# Patient Record
Sex: FEMALE | Race: WHITE | NOT HISPANIC OR LATINO | Employment: FULL TIME | URBAN - METROPOLITAN AREA
[De-identification: names, ages, dates, MRNs, and addresses within clinical notes are randomized per-mention and may not be internally consistent; named-entity substitution may affect disease eponyms.]

---

## 2017-04-12 ENCOUNTER — TELEPHONE (OUTPATIENT)
Dept: FAMILY MEDICINE | Facility: CLINIC | Age: 22
End: 2017-04-12

## 2017-04-19 ENCOUNTER — TELEPHONE (OUTPATIENT)
Dept: FAMILY MEDICINE | Facility: CLINIC | Age: 22
End: 2017-04-19

## 2017-04-19 ENCOUNTER — OFFICE VISIT (OUTPATIENT)
Dept: FAMILY MEDICINE | Facility: CLINIC | Age: 22
End: 2017-04-19
Payer: COMMERCIAL

## 2017-04-19 VITALS
DIASTOLIC BLOOD PRESSURE: 62 MMHG | HEART RATE: 60 BPM | OXYGEN SATURATION: 97 % | BODY MASS INDEX: 23.22 KG/M2 | TEMPERATURE: 97.2 F | WEIGHT: 136 LBS | HEIGHT: 64 IN | SYSTOLIC BLOOD PRESSURE: 106 MMHG | RESPIRATION RATE: 14 BRPM

## 2017-04-19 DIAGNOSIS — N92.6 IRREGULAR MENSES: Primary | ICD-10-CM

## 2017-04-19 DIAGNOSIS — Z20.2 EXPOSURE TO STD: ICD-10-CM

## 2017-04-19 DIAGNOSIS — Z12.4 SCREENING FOR MALIGNANT NEOPLASM OF CERVIX: ICD-10-CM

## 2017-04-19 LAB
B-HCG SERPL-ACNC: <1 IU/L (ref 0–5)
FSH SERPL-ACNC: 9.1 IU/L
LH SERPL-ACNC: 11.8 IU/L
MICRO REPORT STATUS: ABNORMAL
PROLACTIN SERPL-MCNC: 11 UG/L (ref 3–27)
SPECIMEN SOURCE: ABNORMAL
TSH SERPL DL<=0.005 MIU/L-ACNC: 1.84 MU/L (ref 0.4–4)
WET PREP SPEC: ABNORMAL

## 2017-04-19 PROCEDURE — 87591 N.GONORRHOEAE DNA AMP PROB: CPT | Performed by: OBSTETRICS & GYNECOLOGY

## 2017-04-19 PROCEDURE — G0145 SCR C/V CYTO,THINLAYER,RESCR: HCPCS | Performed by: OBSTETRICS & GYNECOLOGY

## 2017-04-19 PROCEDURE — 83002 ASSAY OF GONADOTROPIN (LH): CPT | Performed by: OBSTETRICS & GYNECOLOGY

## 2017-04-19 PROCEDURE — 87491 CHLMYD TRACH DNA AMP PROBE: CPT | Performed by: OBSTETRICS & GYNECOLOGY

## 2017-04-19 PROCEDURE — 84146 ASSAY OF PROLACTIN: CPT | Performed by: OBSTETRICS & GYNECOLOGY

## 2017-04-19 PROCEDURE — 87624 HPV HI-RISK TYP POOLED RSLT: CPT | Performed by: OBSTETRICS & GYNECOLOGY

## 2017-04-19 PROCEDURE — 87210 SMEAR WET MOUNT SALINE/INK: CPT | Performed by: OBSTETRICS & GYNECOLOGY

## 2017-04-19 PROCEDURE — 84443 ASSAY THYROID STIM HORMONE: CPT | Performed by: OBSTETRICS & GYNECOLOGY

## 2017-04-19 PROCEDURE — 99214 OFFICE O/P EST MOD 30 MIN: CPT | Performed by: OBSTETRICS & GYNECOLOGY

## 2017-04-19 PROCEDURE — 84702 CHORIONIC GONADOTROPIN TEST: CPT | Performed by: OBSTETRICS & GYNECOLOGY

## 2017-04-19 PROCEDURE — 36415 COLL VENOUS BLD VENIPUNCTURE: CPT | Performed by: OBSTETRICS & GYNECOLOGY

## 2017-04-19 PROCEDURE — 83001 ASSAY OF GONADOTROPIN (FSH): CPT | Performed by: OBSTETRICS & GYNECOLOGY

## 2017-04-19 ASSESSMENT — PAIN SCALES - GENERAL: PAINLEVEL: NO PAIN (0)

## 2017-04-19 NOTE — PROGRESS NOTES
Josselyn Please inform Laine/ or caretaker  that this result(s) is/are normal except that her wet prep shows bacterial vaginosis- it isnt an STD but more of an inflammation of her own bacterial growth- she should take metronidazole 500 mg BID for 7 days- please ask her where and call it in- avoid alcohol while taking because it will make her nauseated. We will call when we get the other tests back.Thanks. ELHAM Peterson MD

## 2017-04-19 NOTE — LETTER
April 28, 2017    Laine Melgar  [address] [address]    Dear Laine,  We are happy to inform you that your PAP smear result from 4/19/17 is normal.  We are now able to do a follow up test on PAP smears. The DNA test is for HPV (Human Papilloma Virus). Cervical cancer is closely linked with certain types of HPV. Your result showed no evidence of high risk HPV.  Therefore we recommend you return in 3 years for your next pap smear.  You will still need to return to the clinic every year for an annual exam and other preventive tests.  Please contact the clinic at [phone] with any questions.  Sincerely,    Rosalio Peterson MD/nicky

## 2017-04-19 NOTE — PROGRESS NOTES
Subjective: she is requesting a pap and STD testing- just a gen probe. Not blood tests. She would like HPV testing because she is very worried that she could have it. She has had unprotected intercourse. Last period 9 days ago. She sometimes gets irregular periods but doesn't want birth control pills because she is afraid it will hamper her future fertility.   I questioned her twice and she doesn't want blood testing for STDs but is insisting on gen probe and HPV testing.             The past medical history, social history, past surgical history and family history as shown below have been reviewed by me today.  Past Medical History:   Diagnosis Date     Strep throat     hx scarlet fever        Allergies   Allergen Reactions     Sulfa Drugs Hives     Current Outpatient Prescriptions   Medication Sig Dispense Refill     acetaminophen-caff-pyrilamine (MIDOL MENSTRUAL) 500-60-15 MG TABS Take 2 tablets by mouth every 6 hours as needed Reported on 2017       TYLENOL 325 MG OR TABS Reported on 2017       History reviewed. No pertinent surgical history.  Social History     Social History     Marital status: Single     Spouse name: N/A     Number of children: N/A     Years of education: N/A     Social History Main Topics     Smoking status: Never Smoker     Smokeless tobacco: Never Used      Comment: No smokers in home anymore as on .     Alcohol use No     Drug use: No     Sexual activity: Yes     Partners: Male     Other Topics Concern     None     Social History Narrative     Family History   Problem Relation Age of Onset     C.A.D. Maternal Grandfather      C.A.D. Paternal Grandfather      DIABETES Mother      Circulatory Mother      CEREBROVASCULAR DISEASE Father      Hypertension Father      C.A.D. Father      Genetic Disorder Father      kidney transplant     C.A.D. Paternal Grandmother      Congenital Anomalies Other 23     Great-great Grandmother  at 23, possible Marfan's Syndrome       ROS: A 12  "point review of systems was done. Except for what is listed above in the HPI, the systems review is negative .      Objective: Vital signs: Blood pressure 106/62, pulse 60, temperature 97.2  F (36.2  C), temperature source Tympanic, resp. rate 14, height 5' 4\" (1.626 m), weight 136 lb (61.7 kg), last menstrual period 04/10/2017, SpO2 97 %, not currently breastfeeding.    HEENT:    Sclerae and conjunctiva are normal.   Ear canals and TMs look normal.  Nasal mucosa is pink  - no polyps or masses seen.  sinuses are non tender to palpation.  Throat is unremarkable . Mucous membranes are moist.   Neck is supple, mobile, no adenoapthy or masses palpable. Normal range of motion noted.  Chest is clear to auscultation. No wheezes, rales or rhonchi heard.  cardiac exam is normal with s1, s2, no murmurs or adventitious sounds.Normal rate and rhythm is heard.  Abdomen is soft,  nondistended, No masses felt.No HSM. No guarding or rigidity or rebound noted. Palpation reveals  no    tenderness   Normal bowel sounds heard.   Pelvic exam:My nurse Josselyn   was present to chaperone the exam.    The external genitalia appeared normal.      The vaginal vault was without bleeding or odor. There is a whitish discharge noted.     The cervix was smooth and shiny and normal in appearance.      A pap was obtained.   A gen probe   was obtained.   A wet prep was obtained.     No vaginal support defects were noted,      Bimanual exam revealed a  Nulliparous  sized uterus. It does not   descend   well in the vaginal vault.      No adnexal masses were felt.      There was no  cervical motion tenderness.      Exam was NOT   limited by the patient's body habitus.              Assessment/Plan:    1. 21 year old female with irregular menses and exposure to STDs- she wants testing- see lab orders for hormone tests. Also we will send off gen probe, wet prep and pap/HPV.     2. We will plan to call her with reslts.    3. I discussed contraception options " but she wants to stay with condoms.      ELHAM Peterson MD

## 2017-04-19 NOTE — MR AVS SNAPSHOT
"              After Visit Summary   2017    Laine Melgar    MRN: [id number]           Patient Information     Date Of Birth          1995        Visit Information        Provider Department      2017 12:50 PM Rosalio Peterson MD [facility]        Today's Diagnoses     Irregular menses    -  1    Exposure to STD        Screening for malignant neoplasm of cervix           Follow-ups after your visit        Who to contact     If you have questions or need follow up information about today's clinic visit or your schedule please contact [facility] directly at [phone].  Normal or non-critical lab and imaging results will be communicated to you by rankurhart, letter or phone within 4 business days after the clinic has received the results. If you do not hear from us within 7 days, please contact the clinic through rankurhart or phone. If you have a critical or abnormal lab result, we will notify you by phone as soon as possible.  Submit refill requests through OmPrompt or call your pharmacy and they will forward the refill request to us. Please allow 3 business days for your refill to be completed.          Additional Information About Your Visit        MyChart Information     OmPrompt lets you send messages to your doctor, view your test results, renew your prescriptions, schedule appointments and more. To sign up, go to [email or web address] . Click on \"Log in\" on the left side of the screen, which will take you to the Welcome page. Then click on \"Sign up Now\" on the right side of the page.     You will be asked to enter the access code listed below, as well as some personal information. Please follow the directions to create your username and password.     Your access code is: [email or web address]  Expires: 2017  2:02 PM     Your access code will  in 90 days. If you need help or a new code, please call your [facility] or [phone].        Care EveryWhere " "ID     This is your Care EveryWhere ID. This could be used by other organizations to access your [facility] medical records  [id number]        Your Vitals Were     Pulse Temperature Respirations Height Last Period Pulse Oximetry    60 97.2  F (36.2  C) (Tympanic) 14 5' 4\" (1.626 m) 04/10/2017 97%    Breastfeeding? BMI (Body Mass Index)                No 23.34 kg/m2           Blood Pressure from Last 3 Encounters:   04/19/17 106/62   01/14/16 104/62   12/04/15 110/62    Weight from Last 3 Encounters:   04/19/17 136 lb (61.7 kg)   01/14/16 129 lb (58.5 kg)   12/04/15 130 lb (59 kg)              We Performed the Following     CHLAMYDIA TRACHOMATIS PCR     Follicle stimulating hormone     HCG, quantitative, pregnancy     HPV High Risk Types DNA Cervical     Lutropin     NEISSERIA GONORRHOEA PCR     Pap imaged thin layer screen with HPV - recommended age 30 - 65 years (select HPV order below)     Prolactin     TSH with free T4 reflex     Wet prep        Primary Care Provider Office Phone # Fax #    Maximus Ramos [phone]       [facility] [address] [place] [address]        Thank you!     Thank you for choosing [facility]  for your care. Our goal is always to provide you with excellent care. Hearing back from our patients is one way we can continue to improve our services. Please take a few minutes to complete the written survey that you may receive in the mail after your visit with us. Thank you!             Your Updated Medication List - Protect others around you: Learn how to safely use, store and throw away your medicines at www.disposemymeds.org.          This list is accurate as of: 4/19/17  2:12 PM.  Always use your most recent med list.                   Brand Name Dispense Instructions for use    acetaminophen-caff-pyrilamine 500-60-15 MG Tabs per tablet    MIDOL MENSTRUAL     Take 2 tablets by mouth every 6 hours as needed Reported on 4/19/2017 "       TYLENOL 325 MG tablet   Generic drug:  acetaminophen      Reported on 4/19/2017

## 2017-04-19 NOTE — TELEPHONE ENCOUNTER
I called the patients number but there was no way of leaving a message.  If she calls back please give her the providers message.  Jarad Bobby MA

## 2017-04-19 NOTE — TELEPHONE ENCOUNTER
----- Message from Rosalio Peterson MD sent at 4/19/2017  4:12 PM CDT -----  Josselyn Please inform Laine/ or caretaker  that this result(s) is/are normal except that her wet prep shows bacterial vaginosis- it isnt an STD but more of an inflammation of her own bacterial growth- she should take metronidazole 500 mg BID for 7 days- please ask her where and call it in- avoid alcohol while taking because it will make her nauseated. We will call when we get the other tests back.Thanks. ELHAM Peterson MD

## 2017-04-19 NOTE — NURSING NOTE
"Chief Complaint   Patient presents with     Consult       Initial /62 (BP Location: Left arm, Patient Position: Chair, Cuff Size: Adult Regular)  Pulse 60  Temp 97.2  F (36.2  C) (Tympanic)  Resp 14  Ht 5' 4\" (1.626 m)  Wt 136 lb (61.7 kg)  LMP 04/10/2017  SpO2 97%  Breastfeeding? No  BMI 23.34 kg/m2 Estimated body mass index is 23.34 kg/(m^2) as calculated from the following:    Height as of this encounter: 5' 4\" (1.626 m).    Weight as of this encounter: 136 lb (61.7 kg)..   BP completed using cuff size: regular  Medication Rec Completed    Josselyn Tamayo CMA    "

## 2017-04-20 LAB
C TRACH DNA SPEC QL NAA+PROBE: NORMAL
N GONORRHOEA DNA SPEC QL NAA+PROBE: NORMAL
SPECIMEN SOURCE: NORMAL
SPECIMEN SOURCE: NORMAL

## 2017-04-20 RX ORDER — METRONIDAZOLE 500 MG/1
500 TABLET ORAL 2 TIMES DAILY
Qty: 14 TABLET | Refills: 0 | COMMUNITY
Start: 2017-04-20 | End: 2019-09-11

## 2017-04-20 NOTE — TELEPHONE ENCOUNTER
Patient informed and rx called to [facility] per patient request. See historical entered rx  Josselyn Tamayo, CMA

## 2017-04-20 NOTE — PROGRESS NOTES
Josselyn Please inform Laine/ or caretaker  that this result(s) is/are normal.  Thanks. ELHAM Peterson MD

## 2017-04-21 LAB
COPATH REPORT: NORMAL
PAP: NORMAL

## 2017-04-24 LAB
FINAL DIAGNOSIS: NORMAL
HPV HR 12 DNA CVX QL NAA+PROBE: NEGATIVE
HPV16 DNA SPEC QL NAA+PROBE: NEGATIVE
HPV18 DNA SPEC QL NAA+PROBE: NEGATIVE
SPECIMEN DESCRIPTION: NORMAL

## 2018-08-03 ENCOUNTER — OFFICE VISIT (OUTPATIENT)
Dept: FAMILY MEDICINE | Facility: CLINIC | Age: 23
End: 2018-08-03
Payer: COMMERCIAL

## 2018-08-03 VITALS
OXYGEN SATURATION: 100 % | DIASTOLIC BLOOD PRESSURE: 60 MMHG | HEIGHT: 66 IN | WEIGHT: 150.3 LBS | TEMPERATURE: 98.1 F | RESPIRATION RATE: 14 BRPM | BODY MASS INDEX: 24.15 KG/M2 | SYSTOLIC BLOOD PRESSURE: 104 MMHG | HEART RATE: 80 BPM

## 2018-08-03 DIAGNOSIS — Z23 NEED FOR VACCINATION: ICD-10-CM

## 2018-08-03 DIAGNOSIS — Z00.00 ROUTINE GENERAL MEDICAL EXAMINATION AT A HEALTH CARE FACILITY: Primary | ICD-10-CM

## 2018-08-03 PROCEDURE — 90471 IMMUNIZATION ADMIN: CPT | Performed by: FAMILY MEDICINE

## 2018-08-03 PROCEDURE — 90714 TD VACC NO PRESV 7 YRS+ IM: CPT | Performed by: FAMILY MEDICINE

## 2018-08-03 PROCEDURE — 99395 PREV VISIT EST AGE 18-39: CPT | Mod: 25 | Performed by: FAMILY MEDICINE

## 2018-08-03 NOTE — PROGRESS NOTES
SUBJECTIVE:   CC: Laine Melgar is an 22 year old woman who presents for preventive health visit.     Physical   Annual:     Getting at least 3 servings of Calcium per day:  Yes    Bi-annual eye exam:  Yes    Dental care twice a year:  Yes    Sleep apnea or symptoms of sleep apnea:  None    Diet:  Vegetarian/vegan    Frequency of exercise:  1 day/week    Duration of exercise:  15-30 minutes    Taking medications regularly:  Yes    Medication side effects:  None    Additional concerns today:  No            Today's PHQ-2 Score:   PHQ-2 ( 1999 Pfizer) 8/3/2018   Q1: Little interest or pleasure in doing things 0   Q2: Feeling down, depressed or hopeless 0   PHQ-2 Score 0   Q1: Little interest or pleasure in doing things Not at all   Q2: Feeling down, depressed or hopeless Not at all   PHQ-2 Score 0       Abuse: Current or Past(Physical, Sexual or Emotional)- No  Do you feel safe in your environment - Yes    Social History   Substance Use Topics     Smoking status: Never Smoker     Smokeless tobacco: Never Used      Comment: No smokers in home anymore as on 6-09.     Alcohol use No     Alcohol Use 8/3/2018   If you drink alcohol do you typically have greater than 3 drinks per day OR greater than 7 drinks per week? No       Reviewed orders with patient.  Reviewed health maintenance and updated orders accordingly - Yes      Mammogram not appropriate for this patient based on age.    Pertinent mammograms are reviewed under the imaging tab.  History of abnormal Pap smear: NO - age 30- 65 PAP every 3 years recommended  PAP / HPV Latest Ref Rng & Units 4/19/2017 12/4/2015   PAP - NIL NIL   HPV 16 DNA NEG Negative -   HPV 18 DNA NEG Negative -   OTHER HR HPV NEG Negative -     Reviewed and updated as needed this visit by clinical staff  Tobacco  Allergies  Meds  Med Hx  Surg Hx  Fam Hx  Soc Hx        Reviewed and updated as needed this visit by Provider        Past Medical History:   Diagnosis Date     Strep throat      "hx scarlet fever      History reviewed. No pertinent surgical history.    Review of Systems  CONSTITUTIONAL: NEGATIVE for fever, chills, change in weight  INTEGUMENTARU/SKIN: NEGATIVE for worrisome rashes, moles or lesions  EYES: NEGATIVE for vision changes or irritation  ENT: NEGATIVE for ear, mouth and throat problems  RESP: NEGATIVE for significant cough or SOB  BREAST: NEGATIVE for masses, tenderness or discharge  CV: NEGATIVE for chest pain, palpitations or peripheral edema  GI: NEGATIVE for nausea, abdominal pain, heartburn, or change in bowel habits  : NEGATIVE for unusual urinary or vaginal symptoms. Periods are regular.  MUSCULOSKELETAL: NEGATIVE for significant arthralgias or myalgia  NEURO: NEGATIVE for weakness, dizziness or paresthesias  PSYCHIATRIC: NEGATIVE for changes in mood or affect     OBJECTIVE:   /60  Pulse 80  Temp 98.1  F (36.7  C) (Temporal)  Resp 14  Ht 5' 5.95\" (1.675 m)  Wt 150 lb 4.8 oz (68.2 kg)  LMP 07/12/2018 (Approximate)  SpO2 100%  Breastfeeding? No  BMI 24.3 kg/m2  Physical Exam  GENERAL: healthy, alert and no distress  EYES: Eyes grossly normal to inspection, PERRL and conjunctivae and sclerae normal  HENT: ear canals and TM's normal, nose and mouth without ulcers or lesions  NECK: no adenopathy, no asymmetry, masses, or scars and thyroid normal to palpation  RESP: lungs clear to auscultation - no rales, rhonchi or wheezes  CV: regular rate and rhythm, normal S1 S2, no S3 or S4, no murmur, click or rub, no peripheral edema and peripheral pulses strong  ABDOMEN: soft, nontender, no hepatosplenomegaly, no masses and bowel sounds normal  MS: no gross musculoskeletal defects noted, no edema  SKIN: no suspicious lesions or rashes  NEURO: Normal strength and tone, mentation intact and speech normal  PSYCH: mentation appears normal, affect normal/bright        ASSESSMENT/PLAN:   1. Routine general medical examination at a health care facility  Generally healthy he does " "not need pelvic exam done today or Pap smear.    2. Need for vaccination    - TD PRSERV FREE >=7 YRS ADS IM [24606]  - 1st  Administration  [48051]    COUNSELING:  Reviewed preventive health counseling, as reflected in patient instructions       Regular exercise       Healthy diet/nutrition       Contraception       Safe sex practices/STD prevention    BP Readings from Last 1 Encounters:   08/03/18 104/60     Estimated body mass index is 24.3 kg/(m^2) as calculated from the following:    Height as of this encounter: 5' 5.95\" (1.675 m).    Weight as of this encounter: 150 lb 4.8 oz (68.2 kg).           reports that she has never smoked. She has never used smokeless tobacco.      Counseling Resources:  ATP IV Guidelines  Pooled Cohorts Equation Calculator  Breast Cancer Risk Calculator  FRAX Risk Assessment  ICSI Preventive Guidelines  Dietary Guidelines for Americans, 2010  USDA's MyPlate  ASA Prophylaxis  Lung CA Screening    Maximus Ramos MD  [facility]  "

## 2018-08-03 NOTE — NURSING NOTE
Screening Questionnaire for Adult Immunization    Are you sick today?   No   Do you have allergies to medications, food, a vaccine component or latex?   No   Have you ever had a serious reaction after receiving a vaccination?   No   Do you have a long-term health problem with heart disease, lung disease, asthma, kidney disease, metabolic disease (e.g. diabetes), anemia, or other blood disorder?   No   Do you have cancer, leukemia, HIV/AIDS, or any other immune system problem?   No   In the past 3 months, have you taken medications that affect  your immune system, such as prednisone, other steroids, or anticancer drugs; drugs for the treatment of rheumatoid arthritis, Crohn s disease, or psoriasis; or have you had radiation treatments?   No   Have you had a seizure, or a brain or other nervous system problem?   No   During the past year, have you received a transfusion of blood or blood     products, or been given immune (gamma) globulin or antiviral drug?   No   For women: Are you pregnant or is there a chance you could become        pregnant during the next month?   No   Have you received any vaccinations in the past 4 weeks?   No     Immunization questionnaire answers were all negative.        Per orders of Dr. Ramos, injection of Td given by Rosalind Hassan. Patient instructed to remain in clinic for 15 minutes afterwards, and to report any adverse reaction to me immediately.       Screening performed by Rosalind Hassan on 8/3/2018 at 2:36 PM.

## 2018-08-03 NOTE — MR AVS SNAPSHOT
After Visit Summary   8/3/2018    Laine Melgar    MRN: [id number]           Patient Information     Date Of Birth          1995        Visit Information        Provider Department      8/3/2018 2:00 PM Maximus Ramos MD [facility]        Today's Diagnoses     Routine general medical examination at a health care facility    -  1    Need for vaccination          Care Instructions      Preventive Health Recommendations  Female Ages 21 to 25     Yearly exam:     See your health care provider every year in order to  o Review health changes.   o Discuss preventive care.    o Review your medicines if your doctor has prescribed any.      You should be tested each year for STDs (sexually transmitted diseases).       Talk to your provider about how often you should have cholesterol testing.      Get a Pap test every three years. If you have an abnormal result, your doctor may have you test more often.      If you are at risk for diabetes, you should have a diabetes test (fasting glucose).     Shots:     Get a flu shot each year.     Get a tetanus shot every 10 years.     Consider getting the shot (vaccine) that prevents cervical cancer (Gardasil).    Nutrition:     Eat at least 5 servings of fruits and vegetables each day.    Eat whole-grain bread, whole-wheat pasta and brown rice instead of white grains and rice.    Get adequate Calcium and Vitamin D.     Lifestyle    Exercise at least 150 minutes a week each week (30 minutes a day, 5 days a week). This will help you control your weight and prevent disease.    Limit alcohol to one drink per day.    No smoking.     Wear sunscreen to prevent skin cancer.    See your dentist every six months for an exam and cleaning.          Follow-ups after your visit        Who to contact     If you have questions or need follow up information about today's clinic visit or your schedule please contact [facility] directly at  "[phone].  Normal or non-critical lab and imaging results will be communicated to you by MyChart, letter or phone within 4 business days after the clinic has received the results. If you do not hear from us within 7 days, please contact the clinic through Home Chefhart or phone. If you have a critical or abnormal lab result, we will notify you by phone as soon as possible.  Submit refill requests through Imcompany or call your pharmacy and they will forward the refill request to us. Please allow 3 business days for your refill to be completed.          Additional Information About Your Visit        Home Chefhart Information     Imcompany gives you secure access to your electronic health record. If you see a primary care provider, you can also send messages to your care team and make appointments. If you have questions, please call your primary care clinic.  If you do not have a primary care provider, please call [phone] and they will assist you.        Care EveryWhere ID     This is your Care EveryWhere ID. This could be used by other organizations to access your [place] [facility] medical records  [id number]        Your Vitals Were     Pulse Temperature Respirations Height Last Period Pulse Oximetry    80 98.1  F (36.7  C) (Temporal) 14 5' 5.95\" (1.675 m) 07/12/2018 (Approximate) 100%    Breastfeeding? BMI (Body Mass Index)                No 24.3 kg/m2           Blood Pressure from Last 3 Encounters:   08/03/18 104/60   04/19/17 106/62   01/14/16 104/62    Weight from Last 3 Encounters:   08/03/18 150 lb 4.8 oz (68.2 kg)   04/19/17 136 lb (61.7 kg)   01/14/16 129 lb (58.5 kg)              We Performed the Following     1st  Administration  [36210]     TD PRSERV FREE >=7 YRS ADS IM [[id number]]        Primary Care Provider Office Phone # Fax #    Maximus Ramos [phone]       [address] [facility] [address]        Equal Access to Services     BHANU FAJARDO AH: Hadii smith Sandoval " katlyn valdezyoniraffi lionfelipe grove jasminajosafat mccullough. So [facility] [phone].    ATENCIÓN: Si scot herrera, tiene a mcintosh disposición servicios gratuitos de asistencia lingüística. Llame al [phone].    We comply with applicable federal civil rights laws and [place] laws. We do not discriminate on the basis of race, color, national origin, age, disability, sex, sexual orientation, or gender identity.            Thank you!     Thank you for choosing [facility]  for your care. Our goal is always to provide you with excellent care. Hearing back from our patients is one way we can continue to improve our services. Please take a few minutes to complete the written survey that you may receive in the mail after your visit with us. Thank you!             Your Updated Medication List - Protect others around you: Learn how to safely use, store and throw away your medicines at www.disposemymeds.org.          This list is accurate as of 8/3/18 11:59 PM.  Always use your most recent med list.                   Brand Name Dispense Instructions for use Diagnosis    acetaminophen-caff-pyrilamine 500-60-15 MG Tabs per tablet    MIDOL MENSTRUAL     Take 2 tablets by mouth every 6 hours as needed Reported on 4/19/2017        FLAGYL 500 MG tablet   Generic drug:  metroNIDAZOLE     14 tablet    Take 1 tablet (500 mg) by mouth 2 times daily        TYLENOL 325 MG tablet   Generic drug:  acetaminophen      Reported on 4/19/2017    Acute pharyngitis

## 2019-08-13 ENCOUNTER — RESULT FOLLOW UP (OUTPATIENT)
Dept: FAMILY MEDICINE | Facility: CLINIC | Age: 24
End: 2019-08-13

## 2019-08-13 ENCOUNTER — OFFICE VISIT (OUTPATIENT)
Dept: FAMILY MEDICINE | Facility: CLINIC | Age: 24
End: 2019-08-13
Payer: COMMERCIAL

## 2019-08-13 VITALS
HEART RATE: 107 BPM | BODY MASS INDEX: 24.91 KG/M2 | RESPIRATION RATE: 18 BRPM | SYSTOLIC BLOOD PRESSURE: 100 MMHG | WEIGHT: 155 LBS | HEIGHT: 66 IN | OXYGEN SATURATION: 100 % | DIASTOLIC BLOOD PRESSURE: 62 MMHG | TEMPERATURE: 97.7 F

## 2019-08-13 DIAGNOSIS — R87.613 HSIL ON PAP SMEAR OF CERVIX: ICD-10-CM

## 2019-08-13 DIAGNOSIS — Z32.00 PREGNANCY EXAMINATION OR TEST, PREGNANCY UNCONFIRMED: ICD-10-CM

## 2019-08-13 DIAGNOSIS — B37.31 YEAST INFECTION OF THE VAGINA: Primary | ICD-10-CM

## 2019-08-13 DIAGNOSIS — Z11.3 SCREEN FOR STD (SEXUALLY TRANSMITTED DISEASE): ICD-10-CM

## 2019-08-13 DIAGNOSIS — Z00.00 HEALTHCARE MAINTENANCE: ICD-10-CM

## 2019-08-13 DIAGNOSIS — B96.89 BACTERIAL VAGINOSIS: ICD-10-CM

## 2019-08-13 DIAGNOSIS — N76.0 BACTERIAL VAGINOSIS: ICD-10-CM

## 2019-08-13 LAB
B-HCG SERPL-ACNC: <1 IU/L (ref 0–5)
CHOLEST SERPL-MCNC: 154 MG/DL
HDLC SERPL-MCNC: 69 MG/DL
LDLC SERPL CALC-MCNC: 76 MG/DL
NONHDLC SERPL-MCNC: 85 MG/DL
SPECIMEN SOURCE: ABNORMAL
TRIGL SERPL-MCNC: 46 MG/DL
WET PREP SPEC: ABNORMAL

## 2019-08-13 PROCEDURE — 86695 HERPES SIMPLEX TYPE 1 TEST: CPT | Performed by: NURSE PRACTITIONER

## 2019-08-13 PROCEDURE — 86696 HERPES SIMPLEX TYPE 2 TEST: CPT | Performed by: NURSE PRACTITIONER

## 2019-08-13 PROCEDURE — G0145 SCR C/V CYTO,THINLAYER,RESCR: HCPCS | Performed by: NURSE PRACTITIONER

## 2019-08-13 PROCEDURE — G0124 SCREEN C/V THIN LAYER BY MD: HCPCS | Performed by: NURSE PRACTITIONER

## 2019-08-13 PROCEDURE — 87591 N.GONORRHOEAE DNA AMP PROB: CPT | Performed by: NURSE PRACTITIONER

## 2019-08-13 PROCEDURE — 87210 SMEAR WET MOUNT SALINE/INK: CPT | Performed by: NURSE PRACTITIONER

## 2019-08-13 PROCEDURE — 80061 LIPID PANEL: CPT | Performed by: NURSE PRACTITIONER

## 2019-08-13 PROCEDURE — 87491 CHLMYD TRACH DNA AMP PROBE: CPT | Performed by: NURSE PRACTITIONER

## 2019-08-13 PROCEDURE — 99214 OFFICE O/P EST MOD 30 MIN: CPT | Performed by: NURSE PRACTITIONER

## 2019-08-13 PROCEDURE — 36415 COLL VENOUS BLD VENIPUNCTURE: CPT | Performed by: NURSE PRACTITIONER

## 2019-08-13 PROCEDURE — 84702 CHORIONIC GONADOTROPIN TEST: CPT | Performed by: NURSE PRACTITIONER

## 2019-08-13 RX ORDER — METRONIDAZOLE 500 MG/1
500 TABLET ORAL 2 TIMES DAILY
Qty: 14 TABLET | Refills: 0 | Status: SHIPPED | OUTPATIENT
Start: 2019-08-13 | End: 2019-09-11

## 2019-08-13 ASSESSMENT — PAIN SCALES - GENERAL: PAINLEVEL: SEVERE PAIN (6)

## 2019-08-13 ASSESSMENT — MIFFLIN-ST. JEOR: SCORE: 1464.83

## 2019-08-13 NOTE — PROGRESS NOTES
Subjective     Laine Melgar is a 24 year old female who presents to clinic today for the following health issues:    Patient presents with vaginal itching and burning that has gotten worse over the last 4 days. She has lots of white foul smelling discharge. She was in some hot sauna's and in a wet suit many days over a recent infection and is concerned for a yeast infection.     Patient is sexually active, does not use birth control. Is at this point concerned she may have an STD, partner has been with other women in the past and she is concerned she has some open lesions in her vagina. Would like testing, she would also like a pap done due to finding out he had multiple partners, and she would like a pregnancy test.     Laine has no other concerning symptoms to report. No fever or chills. No abdominal pain, chest pain, or palpitations. Mood is stable, no new or worsening depression or anxiety.     HPI   Vaginal Symptoms  Onset: 4 days    Description:  Vaginal Discharge: white   Itching (Pruritis): YES  Burning sensation:  YES  Odor: YES    Accompanying Signs & Symptoms:  Pain with Urination: YES  Abdominal Pain: no   Fever: no     History:   Sexually active: no   New Partner: no   Possibility of Pregnancy:  No    Precipitating factors:   Recent Antibiotic Use: no     Alleviating factors:  Tea tree oil for short time    Therapies Tried and outcome: Tea tree oil, vaginal cream       Patient Active Problem List   Diagnosis     Other acne     Other kyphoscoliosis and scoliosis     Iron deficiency anemia     No past surgical history on file.    Social History     Tobacco Use     Smoking status: Never Smoker     Smokeless tobacco: Never Used     Tobacco comment: No smokers in home anymore as on 6-09.   Substance Use Topics     Alcohol use: No     Alcohol/week: 0.0 oz     Family History   Problem Relation Age of Onset     C.A.D. Maternal Grandfather      C.A.D. Paternal Grandfather      Diabetes Mother      Circulatory  "Mother      Cerebrovascular Disease Father      Hypertension Father      C.A.D. Father      Genetic Disorder Father         kidney transplant     C.A.D. Paternal Grandmother      Congenital Anomalies Other 23        Great-great Grandmother  at 23, possible Marfan's Syndrome         Current Outpatient Medications   Medication Sig Dispense Refill     metroNIDAZOLE (FLAGYL) 500 MG tablet Take 1 tablet (500 mg) by mouth 2 times daily for 7 days 14 tablet 0     miconazole (MONISTAT 1 DAY OR NIGHT) 1200 & 2 MG & % kit Use as directed can repeat if symptoms continue. 1 kit 1     acetaminophen-caff-pyrilamine (MIDOL MENSTRUAL) 500-60-15 MG TABS Take 2 tablets by mouth every 6 hours as needed Reported on 2017       metroNIDAZOLE (FLAGYL) 500 MG tablet Take 1 tablet (500 mg) by mouth 2 times daily 14 tablet 0     TYLENOL 325 MG OR TABS Reported on 2017       Allergies   Allergen Reactions     Sulfa Drugs Hives         Reviewed and updated as needed this visit by Provider         Review of Systems   ROS COMP: Constitutional, HEENT, cardiovascular, pulmonary, gi and gu systems are negative, except as otherwise noted.      Objective    /62   Pulse 107   Temp 97.7  F (36.5  C) (Temporal)   Resp 18   Ht 1.676 m (5' 6\")   Wt 70.3 kg (155 lb)   LMP 2019   SpO2 100%   BMI 25.02 kg/m    Body mass index is 25.02 kg/m .  Physical Exam   GENERAL: healthy, alert and no distress  EYES: Eyes grossly normal to inspection, PERRL and conjunctivae and sclerae normal  NECK: no adenopathy, no asymmetry, masses, or scars and thyroid normal to palpation  RESP: lungs clear to auscultation - no rales, rhonchi or wheezes  CV: regular rate and rhythm, normal S1 S2, no S3 or S4, no murmur, click or rub, no peripheral edema and peripheral pulses strong  ABDOMEN: soft, nontender, no hepatosplenomegaly, no masses and bowel sounds normal   (female): normal female external genitalia, normal urethral meatus , vaginal " discharge - copious, white and watery and vaginal skin findings: Vaginal introitus with erythema red abraded skin. Painful with insertion of speculum.  MS: no gross musculoskeletal defects noted, no edema  SKIN: no suspicious lesions or rashes  PSYCH: mentation appears normal, affect normal/bright    Diagnostic Test Results:  Pending.   Will be screening For STD's, Pregnancy, and Wet Prep.         Assessment & Plan     1. Yeast infection of the vagina  - We will get her miconozole per her request, she is convinced it is yeast wants to start treatment immediately. She is aware she may need a different prescription depending on the findings.   - Wet prep  - miconazole (MONISTAT 1 DAY OR NIGHT) 1200 & 2 MG & % kit; Use as directed can repeat if symptoms continue.  Dispense: 1 kit; Refill: 1    2. Screen for STD (sexually transmitted disease)  - Discussed keeping herself safe from STD's at great length.   - Neisseria gonorrhoeae PCR  - Chlamydia trachomatis PCR  - Herpes Simplex Virus 1 and 2 IgG  - Pap imaged thin layer screen reflex to HPV if ASCUS - recommend age 25 - 29    3. Pregnancy examination or test, pregnancy unconfirmed  - She is in a committed relationship at this point. States if she gets pregnant it is okay with both of them.   - I instructed her to start on a good multivitamin with folic acid.   - HCG Quantitative Pregnancy, Blood ([id number])    4. Healthcare maintenance    - Lipid panel reflex to direct LDL Fasting    5. Bacterial vaginosis  - She was called with prescription and told no to use the miconazole.   - metroNIDAZOLE (FLAGYL) 500 MG tablet; Take 1 tablet (500 mg) by mouth 2 times daily for 7 days  Dispense: 14 tablet; Refill: 0     Patient verbalized plan of care and she is in agreement with plan of care.     Return if symptoms worsen or fail to improve.    Shashi Syed, NP  [facility]

## 2019-08-13 NOTE — PATIENT INSTRUCTIONS
We will get all labs and call you with results.     If I need to treat you we will call you with a prescription.     Call clinic with new or worsening symptoms prior to next follow up appointment.     Use the miconozole kit as directed. This will help if this is a yeast infection.    Also use some Desitin cream to help with the pain, and protect the skin from urine. Put over pain skin in the morning and before bed.     Use baby oil to take the clean the Desitin off your skin.    Shashi Syed CNP

## 2019-08-14 LAB
C TRACH DNA SPEC QL NAA+PROBE: NEGATIVE
HSV1 IGG SERPL QL IA: >8 AI (ref 0–0.8)
HSV2 IGG SERPL QL IA: <0.2 AI (ref 0–0.8)
N GONORRHOEA DNA SPEC QL NAA+PROBE: NEGATIVE
SPECIMEN SOURCE: NORMAL
SPECIMEN SOURCE: NORMAL

## 2019-08-15 ENCOUNTER — TELEPHONE (OUTPATIENT)
Dept: FAMILY MEDICINE | Facility: CLINIC | Age: 24
End: 2019-08-15

## 2019-08-15 DIAGNOSIS — B00.9 HERPES SIMPLEX VIRUS INFECTION: Primary | ICD-10-CM

## 2019-08-15 LAB
COPATH REPORT: ABNORMAL
PAP: ABNORMAL

## 2019-08-15 RX ORDER — ACYCLOVIR 400 MG/1
400 TABLET ORAL EVERY 8 HOURS
Qty: 15 TABLET | Refills: 0 | Status: SHIPPED | OUTPATIENT
Start: 2019-08-15 | End: 2019-09-11

## 2019-08-15 RX ORDER — ACYCLOVIR 400 MG/1
400 TABLET ORAL EVERY 8 HOURS
Qty: 30 TABLET | Refills: 0 | Status: SHIPPED | OUTPATIENT
Start: 2019-08-15 | End: 2019-09-11

## 2019-08-15 RX ORDER — ACYCLOVIR 50 MG/G
CREAM TOPICAL
Qty: 5 G | Refills: 3 | Status: SHIPPED | OUTPATIENT
Start: 2019-08-15 | End: 2019-09-11

## 2019-08-15 NOTE — TELEPHONE ENCOUNTER
Patient was called with screening results.  She was negative for gonorrhea and chlamydia but was positive for the herpes simplex 1 virus.    She is feeling better on the Flagyl but is still having a lot of pain and tenderness vaginally she would like to be treated for the herpes virus as well.    We will start her on the acyclovir 400 mg 3 times a day for 10 days as this is the initial dose for the first outbreak.  I will also give her cream topically of the acyclovir for the pain that she is having in the perineum.    I will provide her with a recurrent dose of acyclovir this will be 400 mg 3 times a day for 5 days she was instructed to start this for future outbreaks.    Reviewed the diagnosis the course of the herpes simplex disease letter know this is not something we can make go away but we can manage symptoms.  Also informed her that she would need to notify any physicians that she had a herpes simplex virus if she becomes pregnant.    Patient verbalized understanding of plan of care, she is in agreement with current plan of care.    Shashi Syed CNP

## 2019-08-16 PROBLEM — R87.613 HSIL ON PAP SMEAR OF CERVIX: Status: ACTIVE | Noted: 2019-08-16

## 2019-08-16 NOTE — PROGRESS NOTES
8/13/19 HSIL & LSIL pap @ 23 yo. Plan: [place] with ECC.   8/16/19 Pt notified by phone.  9/11/19 [place] bx: ANABELA 1. Plan: Cotest in 1 yr.

## 2019-09-11 ENCOUNTER — OFFICE VISIT (OUTPATIENT)
Dept: FAMILY MEDICINE | Facility: CLINIC | Age: 24
End: 2019-09-11
Payer: COMMERCIAL

## 2019-09-11 VITALS
HEART RATE: 84 BPM | OXYGEN SATURATION: 100 % | TEMPERATURE: 97.1 F | BODY MASS INDEX: 25.18 KG/M2 | DIASTOLIC BLOOD PRESSURE: 70 MMHG | RESPIRATION RATE: 16 BRPM | WEIGHT: 156 LBS | SYSTOLIC BLOOD PRESSURE: 120 MMHG

## 2019-09-11 DIAGNOSIS — Z23 NEED FOR HPV VACCINE: ICD-10-CM

## 2019-09-11 DIAGNOSIS — R87.613 HSIL ON PAP SMEAR OF CERVIX: Primary | ICD-10-CM

## 2019-09-11 DIAGNOSIS — Z23 NEED FOR VACCINATION: ICD-10-CM

## 2019-09-11 DIAGNOSIS — Z32.00 PREGNANCY EXAMINATION OR TEST, PREGNANCY UNCONFIRMED: ICD-10-CM

## 2019-09-11 LAB — HCG UR QL: NEGATIVE

## 2019-09-11 PROCEDURE — 88305 TISSUE EXAM BY PATHOLOGIST: CPT | Performed by: FAMILY MEDICINE

## 2019-09-11 PROCEDURE — 90471 IMMUNIZATION ADMIN: CPT | Performed by: FAMILY MEDICINE

## 2019-09-11 PROCEDURE — 81025 URINE PREGNANCY TEST: CPT | Performed by: FAMILY MEDICINE

## 2019-09-11 PROCEDURE — 57454 BX/CURETT OF CERVIX W/SCOPE: CPT | Performed by: FAMILY MEDICINE

## 2019-09-11 PROCEDURE — 90651 9VHPV VACCINE 2/3 DOSE IM: CPT | Performed by: FAMILY MEDICINE

## 2019-09-11 ASSESSMENT — PAIN SCALES - GENERAL: PAINLEVEL: NO PAIN (0)

## 2019-09-11 NOTE — PROGRESS NOTES
Laine Melgar is a  female who presents for initial colposcopy, referred by Shashi Syed NP. Pap smear 1 months ago showed: HSIL. The prior pap showed normal.     Past Medical History:   Diagnosis Date     Strep throat     hx scarlet fever     History reviewed. No pertinent surgical history.  Social History     Socioeconomic History     Marital status: Single     Spouse name: Not on file     Number of children: Not on file     Years of education: Not on file     Highest education level: Not on file   Occupational History     Not on file   Social Needs     Financial resource strain: Not on file     Food insecurity:     Worry: Not on file     Inability: Not on file     Transportation needs:     Medical: Not on file     Non-medical: Not on file   Tobacco Use     Smoking status: Never Smoker     Smokeless tobacco: Never Used     Tobacco comment: No smokers in home anymore as on 6-09.   Substance and Sexual Activity     Alcohol use: No     Alcohol/week: 0.0 oz     Drug use: No     Sexual activity: Yes     Partners: Male   Lifestyle     Physical activity:     Days per week: Not on file     Minutes per session: Not on file     Stress: Not on file   Relationships     Social connections:     Talks on phone: Not on file     Gets together: Not on file     Attends Spiritism service: Not on file     Active member of club or organization: Not on file     Attends meetings of clubs or organizations: Not on file     Relationship status: Not on file     Intimate partner violence:     Fear of current or ex partner: Not on file     Emotionally abused: Not on file     Physically abused: Not on file     Forced sexual activity: Not on file   Other Topics Concern     Parent/sibling w/ CABG, MI or angioplasty before 65F 55M? Not Asked   Social History Narrative     Not on file        Current Outpatient Medications on File Prior to Visit:  acetaminophen-caff-pyrilamine (MIDOL MENSTRUAL) 500-60-15 MG TABS Take 2 tablets by mouth every 6  hours as needed Reported on 4/19/2017   TYLENOL 325 MG OR TABS Reported on 4/19/2017     No current facility-administered medications on file prior to visit.    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 :  No, she had this done at the time of her Pap smear.  History of genital warts:  No  Visible warts now?:  No  Previously treated? If so, how?:  No    Patient's last menstrual period was 09/03/2019.  Type of contraception: None  Age at first sexual intercourse: 17  Number of sexual partners (lifetime): Greater than 3    History of sexual abuse:  No      PROCEDURE:  Before the procedure, it was ensured that the patient was educated regarding the nature of her findings to date, the implications of them, and what was to be done. She has been made aware of the role of HPV, the natural history of infection, ways to minimize her future risk, the effect of HPV on the cervix, and treatment options available should they be indicated. The pathophysiology of the cervix, including a discussion of squamous vs. endometrial cells, and squamous metaplasia have all been reviewed, using illustrations and sketches. The details of the colposcopic procedure were reviewed, as well as the risks of missed diagnoses, pain, infection and bleeding. All questions were answered before proceeding, and informed consent was therefore obtained.    Bimanual examination: was not done  Unenhanced examination of the cervix was normal without lesions.  Pap smear and endocervical sampling not obtained due to:    not due  Please refer to images section for details!  Pap repeated?:  No  SCJ seen?:  yes  Endocervical speculum needed?:  No    Colposcopy/LEEP  Date/Time: 9/11/2019 11:49 AM  Performed by: Moises Joshi MD  Authorized by: Adi  Moises GREENE MD     Consent:     Consent obtained:  Written    Consent given by:  Patient    Procedural risks discussed:  Bleeding, failure rate, infection and repeat procedure    Patient questions answered: yes      Patient agrees, verbalizes understanding, and wants to proceed: yes      Educational handouts given: no      Instructions and paperwork completed: no    Universal protocol:     Patient states understanding of procedure being performed: yes      Relevant documents present and verified: yes      Test results available and properly labeled: yes    Pre-procedure:     Pre-procedure timeout performed: yes      Premeds:  Ibuprofen    Prepped with: acetic acid and Lugol    Indication:     Indication:  HSIL  Procedure:     Procedure: Colposcopy w/ cervical biopsy and ECC      Under satisfactory analgesia the patient was prepped and draped in the dorsal lithotomy position: no      [place] speculum was placed in the vagina: yes      Under colposcopic examination the transition zone was seen in entirety: yes      Intracervical block was performed: no      Tampon inserted: no      Monsel's solution was applied: yes      Biopsy(s): yes      Location:  12:00 and 5:00 o'clock along with an endocervical curetting.      Specimen to pathology: yes    Post-procedure:     Findings: Bleeding and White epithelium      Impression: High grade cervical dysplasia      Patient tolerance of procedure:  Patient tolerated the procedure well with no immediate complications  Comments:      24-year-old with a normal Pap smear in 2017 now with an HSIL Pap smear.  She was not vaccinated against HPV so we did start that today.  Her colposcopy today was consistent with abnormal findings that would be probably a ANABELA-2.  Biopsies are pending.  She had minimal bleeding that was controlled with silver nitrate sticks and Monsel solution.  She tolerated the procedure very well and had no significant cramping or pain.  She will refrain from  intercourse for the next 7 days to help facilitate healing.  I will contact her with the pathology report when I have them available and then we will make plans for follow-up.  If she has ANABELA-2 or less then we can probably repeat Pap smear in a year but if she does get ANABELA-3 then we may need to consider doing a LEEP procedure.    Electronically signed by:  Moises Joshi M.D.  9/11/2019        OBGyn Exam

## 2019-09-11 NOTE — NURSING NOTE
Prior to immunization administration, verified patients identity using patient s name and date of birth. Please see Immunization Activity for additional information.     Screening Questionnaire for Adult Immunization    Are you sick today?   No   Do you have allergies to medications, food, a vaccine component or latex?   No   Have you ever had a serious reaction after receiving a vaccination?   No   Do you have a long-term health problem with heart disease, lung disease, asthma, kidney disease, metabolic disease (e.g. diabetes), anemia, or other blood disorder?   No   Do you have cancer, leukemia, HIV/AIDS, or any other immune system problem?   No   In the past 3 months, have you taken medications that affect  your immune system, such as prednisone, other steroids, or anticancer drugs; drugs for the treatment of rheumatoid arthritis, Crohn s disease, or psoriasis; or have you had radiation treatments?   No   Have you had a seizure, or a brain or other nervous system problem?   No   During the past year, have you received a transfusion of blood or blood     products, or been given immune (gamma) globulin or antiviral drug?   No   For women: Are you pregnant or is there a chance you could become        pregnant during the next month?   No   Have you received any vaccinations in the past 4 weeks?   No     Immunization questionnaire answers were all negative.       Patient instructed to remain in clinic for 15 minutes afterwards, and to report any adverse reaction to me immediately.       Screening performed by Sharita Dorsey on 9/11/2019 at 10:47 AM.

## 2019-09-13 LAB — COPATH REPORT: NORMAL

## 2019-10-14 ENCOUNTER — TELEPHONE (OUTPATIENT)
Dept: FAMILY MEDICINE | Facility: CLINIC | Age: 24
End: 2019-10-14

## 2019-10-14 NOTE — TELEPHONE ENCOUNTER
Please review the [employer] request copy/pasted for you to review.    Thank you,  Mili GREENE    Appointment Request From: Laine Melgar      With Provider: Moises Joshi MD, MD [[facility]]      Preferred Date Range: 10/21/2019 - 11/8/2019      Preferred Times: Any time      Reason for visit: Request an Appointment      Comments:   I need a Pap smear done so I can determine whether I have healed my mild dysplasia. Followed by my second dose of the HPV Vaccine. Thank you.

## 2019-10-31 ENCOUNTER — OFFICE VISIT (OUTPATIENT)
Dept: FAMILY MEDICINE | Facility: CLINIC | Age: 24
End: 2019-10-31
Payer: COMMERCIAL

## 2019-10-31 VITALS
RESPIRATION RATE: 16 BRPM | OXYGEN SATURATION: 98 % | SYSTOLIC BLOOD PRESSURE: 122 MMHG | WEIGHT: 157 LBS | TEMPERATURE: 97.4 F | BODY MASS INDEX: 25.34 KG/M2 | DIASTOLIC BLOOD PRESSURE: 78 MMHG | HEART RATE: 84 BPM

## 2019-10-31 DIAGNOSIS — N87.0 DYSPLASIA OF CERVIX, LOW GRADE (CIN 1): ICD-10-CM

## 2019-10-31 DIAGNOSIS — Z23 NEED FOR VACCINATION: ICD-10-CM

## 2019-10-31 PROCEDURE — 90651 9VHPV VACCINE 2/3 DOSE IM: CPT | Performed by: FAMILY MEDICINE

## 2019-10-31 PROCEDURE — 99213 OFFICE O/P EST LOW 20 MIN: CPT | Mod: 25 | Performed by: FAMILY MEDICINE

## 2019-10-31 PROCEDURE — 90471 IMMUNIZATION ADMIN: CPT | Performed by: FAMILY MEDICINE

## 2019-10-31 ASSESSMENT — PAIN SCALES - GENERAL: PAINLEVEL: NO PAIN (0)

## 2019-10-31 NOTE — PROGRESS NOTES
Prior to immunization administration, verified patients identity using patient s name and date of birth. Please see Immunization Activity for additional information.     Screening Questionnaire for Adult Immunization    Are you sick today?   No   Do you have allergies to medications, food, a vaccine component or latex?   No   Have you ever had a serious reaction after receiving a vaccination?   No   Do you have a long-term health problem with heart disease, lung disease, asthma, kidney disease, metabolic disease (e.g. diabetes), anemia, or other blood disorder?   No   Do you have cancer, leukemia, HIV/AIDS, or any other immune system problem?   No   In the past 3 months, have you taken medications that affect  your immune system, such as prednisone, other steroids, or anticancer drugs; drugs for the treatment of rheumatoid arthritis, Crohn s disease, or psoriasis; or have you had radiation treatments?   No   Have you had a seizure, or a brain or other nervous system problem?   No   During the past year, have you received a transfusion of blood or blood     products, or been given immune (gamma) globulin or antiviral drug?   No   For women: Are you pregnant or is there a chance you could become        pregnant during the next month?   No   Have you received any vaccinations in the past 4 weeks?   No     Immunization questionnaire answers were all negative.        Per orders of Dr. Correa , injection of HPV given by Carolyne Doe MA. Patient instructed to remain in clinic for 15 minutes afterwards, and to report any adverse reaction to me immediately.       Screening performed by Carolyne Doe MA on 10/31/2019 at 12:26 PM.

## 2019-10-31 NOTE — NURSING NOTE
Chief Complaint   Patient presents with     Gyn Exam     Wants to talk about Pap smear and get the HPV vaccine.     MP/MA

## 2019-10-31 NOTE — PROGRESS NOTES
Subjective     Laine Melgar is a 24 year old female who presents to clinic today for the following health issues:    HPI   Chief Complaint   Patient presents with     Gyn Exam     Wants to talk about Pap smear and get the HPV vaccine.       Patient Active Problem List   Diagnosis     Other acne     Other kyphoscoliosis and scoliosis     Iron deficiency anemia     HSIL on Pap smear of cervix     No past surgical history on file.    Social History     Tobacco Use     Smoking status: Never Smoker     Smokeless tobacco: Never Used     Tobacco comment: No smokers in home anymore as on .   Substance Use Topics     Alcohol use: No     Alcohol/week: 0.0 standard drinks     Family History   Problem Relation Age of Onset     C.A.D. Maternal Grandfather      C.A.D. Paternal Grandfather      Diabetes Mother      Circulatory Mother      Fibromyalgia Mother      Cerebrovascular Disease Father      Hypertension Father      C.A.D. Father      Genetic Disorder Father         kidney transplant     Cancer Father         of the red blood cells,  at 57     C.A.D. Paternal Grandmother      Congenital Anomalies Other 23        Great-great Grandmother  at 23, possible Marfan's Syndrome     Cancer Brother         Meraz Sarcoma, first diagnosed as teenager, now has third cancer     Breast Cancer Maternal Aunt          in 60s     Cervical Cancer Maternal Cousin          Current Outpatient Medications   Medication Sig Dispense Refill     acetaminophen-caff-pyrilamine (MIDOL MENSTRUAL) 500-60-15 MG TABS Take 2 tablets by mouth every 6 hours as needed Reported on 2017       TYLENOL 325 MG OR TABS Reported on 2017       Reviewed and updated as needed this visit by Provider       Review of Systems   ROS COMP: Constitutional, HEENT, cardiovascular, pulmonary, gi and gu systems are negative, except as otherwise noted.      Objective    /78   Pulse 84   Temp 97.4  F (36.3  C) (Temporal)   Resp 16   Wt 71.2 kg (157  "lb)   LMP 10/01/2019   SpO2 98%   BMI 25.34 kg/m    Body mass index is 25.34 kg/m .  Physical Exam   GENERAL: healthy, alert and no distress  RESP: lungs clear to auscultation - no rales, rhonchi or wheezes  CV: regular rate and rhythm, normal S1 S2, no S3 or S4, no murmur, click or rub, no peripheral edema and peripheral pulses strong    Diagnostic Test Results:  Labs reviewed in Epic     Assessment & Plan   (N87.0) Dysplasia of cervix, low grade (ANABELA 1) colpo Bx  Comment: previous pap with HSIL and colpo bx on 9/11/19 showed CIN1.  She thought she needed a repeat pap smear today.  Plan: reassured her that no pap is due yet, but will repeat it a year from her biopsy, 9/2020.    (Z23) Need for vaccination    Comment: Patient is due for her second HPV vaccination today. She was concerned about her abnormal pap and wanted to repeat it today, but she is not due.   Plan: HUMAN PAPILLOMA VIRUS (GARDASIL 9) VACCINE         [46599], 1st  Administration  [17100]        Follow up on PAP smear in one year     BMI:   Estimated body mass index is 25.34 kg/m  as calculated from the following:    Height as of 8/13/19: 1.676 m (5' 6\").    Weight as of this encounter: 71.2 kg (157 lb).   Weight management plan: Discussed healthy diet and exercise guidelines      No follow-ups on file.    This document serves as a record of the services and decisions personally performed and made by Moises Gonzalez MD.  It was created on his behalf by Mray Calderón, a trained medical student and scribe.  The creation of this record is based on the provider's personal observations and the statements of the patient.     Mary Calderón, MPH, MS3  October 31, 2019    I agree with the PFSH and ROS as completed by the MS.  The remainder of the encounter was performed by me and scribed by the MS.  The scribed note accurately reflects my personal services and the decisions made by me.     Electronically signed by:  Moises Joshi " M.D.  10/31/2019

## 2019-11-04 ENCOUNTER — HEALTH MAINTENANCE LETTER (OUTPATIENT)
Age: 24
End: 2019-11-04

## 2020-03-13 ENCOUNTER — ALLIED HEALTH/NURSE VISIT (OUTPATIENT)
Dept: FAMILY MEDICINE | Facility: CLINIC | Age: 25
End: 2020-03-13
Payer: COMMERCIAL

## 2020-03-13 DIAGNOSIS — N91.2 AMENORRHEA: ICD-10-CM

## 2020-03-13 DIAGNOSIS — Z23 NEED FOR VACCINATION: Primary | ICD-10-CM

## 2020-03-13 LAB — HCG UR QL: NEGATIVE

## 2020-03-13 PROCEDURE — 90651 9VHPV VACCINE 2/3 DOSE IM: CPT

## 2020-03-13 PROCEDURE — 90471 IMMUNIZATION ADMIN: CPT

## 2020-03-13 PROCEDURE — 99207 ZZC NO CHARGE NURSE ONLY: CPT

## 2020-03-13 PROCEDURE — 81025 URINE PREGNANCY TEST: CPT | Performed by: FAMILY MEDICINE

## 2020-06-08 ENCOUNTER — MYC MEDICAL ADVICE (OUTPATIENT)
Dept: FAMILY MEDICINE | Facility: CLINIC | Age: 25
End: 2020-06-08

## 2020-06-08 ENCOUNTER — TELEPHONE (OUTPATIENT)
Dept: FAMILY MEDICINE | Facility: CLINIC | Age: 25
End: 2020-06-08

## 2020-09-10 ENCOUNTER — OFFICE VISIT (OUTPATIENT)
Dept: FAMILY MEDICINE | Facility: CLINIC | Age: 25
End: 2020-09-10
Payer: COMMERCIAL

## 2020-09-10 VITALS
TEMPERATURE: 98.1 F | SYSTOLIC BLOOD PRESSURE: 100 MMHG | DIASTOLIC BLOOD PRESSURE: 60 MMHG | HEART RATE: 98 BPM | WEIGHT: 157 LBS | BODY MASS INDEX: 25.23 KG/M2 | HEIGHT: 66 IN | RESPIRATION RATE: 18 BRPM | OXYGEN SATURATION: 100 %

## 2020-09-10 DIAGNOSIS — N89.8 VAGINAL DISCHARGE: ICD-10-CM

## 2020-09-10 DIAGNOSIS — B96.89 BV (BACTERIAL VAGINOSIS): ICD-10-CM

## 2020-09-10 DIAGNOSIS — N76.0 BV (BACTERIAL VAGINOSIS): ICD-10-CM

## 2020-09-10 DIAGNOSIS — N87.0 DYSPLASIA OF CERVIX, LOW GRADE (CIN 1): Primary | ICD-10-CM

## 2020-09-10 LAB
SPECIMEN SOURCE: ABNORMAL
WET PREP SPEC: ABNORMAL

## 2020-09-10 PROCEDURE — 99214 OFFICE O/P EST MOD 30 MIN: CPT | Performed by: FAMILY MEDICINE

## 2020-09-10 PROCEDURE — G0145 SCR C/V CYTO,THINLAYER,RESCR: HCPCS | Performed by: FAMILY MEDICINE

## 2020-09-10 PROCEDURE — 87210 SMEAR WET MOUNT SALINE/INK: CPT | Performed by: FAMILY MEDICINE

## 2020-09-10 RX ORDER — METRONIDAZOLE 500 MG/1
500 TABLET ORAL 2 TIMES DAILY
Qty: 14 TABLET | Refills: 0 | Status: SHIPPED | OUTPATIENT
Start: 2020-09-10 | End: 2020-09-17

## 2020-09-10 ASSESSMENT — MIFFLIN-ST. JEOR: SCORE: 1465.73

## 2020-09-10 ASSESSMENT — PAIN SCALES - GENERAL: PAINLEVEL: NO PAIN (0)

## 2020-09-16 LAB
COPATH REPORT: NORMAL
PAP: NORMAL

## 2020-09-19 ENCOUNTER — MYC MEDICAL ADVICE (OUTPATIENT)
Dept: FAMILY MEDICINE | Facility: CLINIC | Age: 25
End: 2020-09-19

## 2020-09-21 ENCOUNTER — PATIENT OUTREACH (OUTPATIENT)
Dept: FAMILY MEDICINE | Facility: CLINIC | Age: 25
End: 2020-09-21

## 2020-09-21 DIAGNOSIS — R87.613 HSIL ON PAP SMEAR OF CERVIX: ICD-10-CM

## 2020-09-21 NOTE — TELEPHONE ENCOUNTER
8/13/19 HSIL & LSIL pap @ 23 yo. Plan: [place] with ECC.   9/11/19 [place] bx: ANABELA 1. Plan: [facility] in 1 yr.  9/10/20 [facility]IL pap, no HPV ordered. Will plan cotest in 1 year

## 2020-11-16 ENCOUNTER — HEALTH MAINTENANCE LETTER (OUTPATIENT)
Age: 25
End: 2020-11-16

## 2021-04-19 ENCOUNTER — MYC MEDICAL ADVICE (OUTPATIENT)
Dept: FAMILY MEDICINE | Facility: CLINIC | Age: 26
End: 2021-04-19

## 2021-10-05 ENCOUNTER — OFFICE VISIT (OUTPATIENT)
Dept: FAMILY MEDICINE | Facility: CLINIC | Age: 26
End: 2021-10-05
Payer: COMMERCIAL

## 2021-10-05 VITALS
RESPIRATION RATE: 18 BRPM | DIASTOLIC BLOOD PRESSURE: 62 MMHG | TEMPERATURE: 96.9 F | HEART RATE: 110 BPM | SYSTOLIC BLOOD PRESSURE: 110 MMHG | BODY MASS INDEX: 26.84 KG/M2 | OXYGEN SATURATION: 98 % | WEIGHT: 167 LBS | HEIGHT: 66 IN

## 2021-10-05 DIAGNOSIS — Z11.59 ENCOUNTER FOR HEPATITIS C SCREENING TEST FOR LOW RISK PATIENT: ICD-10-CM

## 2021-10-05 DIAGNOSIS — Z00.00 ROUTINE GENERAL MEDICAL EXAMINATION AT A HEALTH CARE FACILITY: Primary | ICD-10-CM

## 2021-10-05 DIAGNOSIS — Z11.4 SCREENING FOR HUMAN IMMUNODEFICIENCY VIRUS WITHOUT PRESENCE OF RISK FACTORS: ICD-10-CM

## 2021-10-05 DIAGNOSIS — N89.8 VAGINAL DISCHARGE: ICD-10-CM

## 2021-10-05 LAB
CLUE CELLS: ABNORMAL
HCV AB SERPL QL IA: NONREACTIVE
HIV 1+2 AB+HIV1 P24 AG SERPL QL IA: NONREACTIVE
TRICHOMONAS, WET PREP: ABNORMAL
WBC'S/HIGH POWER FIELD, WET PREP: ABNORMAL
YEAST, WET PREP: PRESENT

## 2021-10-05 PROCEDURE — G0145 SCR C/V CYTO,THINLAYER,RESCR: HCPCS | Performed by: FAMILY MEDICINE

## 2021-10-05 PROCEDURE — 36415 COLL VENOUS BLD VENIPUNCTURE: CPT | Performed by: FAMILY MEDICINE

## 2021-10-05 PROCEDURE — 99213 OFFICE O/P EST LOW 20 MIN: CPT | Mod: 25 | Performed by: FAMILY MEDICINE

## 2021-10-05 PROCEDURE — 87624 HPV HI-RISK TYP POOLED RSLT: CPT | Performed by: FAMILY MEDICINE

## 2021-10-05 PROCEDURE — 87389 HIV-1 AG W/HIV-1&-2 AB AG IA: CPT | Performed by: FAMILY MEDICINE

## 2021-10-05 PROCEDURE — 87210 SMEAR WET MOUNT SALINE/INK: CPT | Performed by: FAMILY MEDICINE

## 2021-10-05 PROCEDURE — 86803 HEPATITIS C AB TEST: CPT | Performed by: FAMILY MEDICINE

## 2021-10-05 PROCEDURE — 99395 PREV VISIT EST AGE 18-39: CPT | Performed by: FAMILY MEDICINE

## 2021-10-05 SDOH — ECONOMIC STABILITY: FOOD INSECURITY: WITHIN THE PAST 12 MONTHS, THE FOOD YOU BOUGHT JUST DIDN'T LAST AND YOU DIDN'T HAVE MONEY TO GET MORE.: NEVER TRUE

## 2021-10-05 SDOH — ECONOMIC STABILITY: TRANSPORTATION INSECURITY
IN THE PAST 12 MONTHS, HAS THE LACK OF TRANSPORTATION KEPT YOU FROM MEDICAL APPOINTMENTS OR FROM GETTING MEDICATIONS?: NO

## 2021-10-05 SDOH — ECONOMIC STABILITY: TRANSPORTATION INSECURITY
IN THE PAST 12 MONTHS, HAS LACK OF TRANSPORTATION KEPT YOU FROM MEETINGS, WORK, OR FROM GETTING THINGS NEEDED FOR DAILY LIVING?: NO

## 2021-10-05 SDOH — ECONOMIC STABILITY: FOOD INSECURITY: WITHIN THE PAST 12 MONTHS, YOU WORRIED THAT YOUR FOOD WOULD RUN OUT BEFORE YOU GOT MONEY TO BUY MORE.: NEVER TRUE

## 2021-10-05 SDOH — HEALTH STABILITY: PHYSICAL HEALTH: ON AVERAGE, HOW MANY MINUTES DO YOU ENGAGE IN EXERCISE AT THIS LEVEL?: 30 MIN

## 2021-10-05 SDOH — ECONOMIC STABILITY: INCOME INSECURITY: IN THE LAST 12 MONTHS, WAS THERE A TIME WHEN YOU WERE NOT ABLE TO PAY THE MORTGAGE OR RENT ON TIME?: NO

## 2021-10-05 SDOH — HEALTH STABILITY: PHYSICAL HEALTH: ON AVERAGE, HOW MANY DAYS PER WEEK DO YOU ENGAGE IN MODERATE TO STRENUOUS EXERCISE (LIKE A BRISK WALK)?: 3 DAYS

## 2021-10-05 ASSESSMENT — SOCIAL DETERMINANTS OF HEALTH (SDOH)
IN A TYPICAL WEEK, HOW MANY TIMES DO YOU TALK ON THE PHONE WITH FAMILY, FRIENDS, OR NEIGHBORS?: MORE THAN THREE TIMES A WEEK
WITHIN THE LAST YEAR, HAVE YOU BEEN HUMILIATED OR EMOTIONALLY ABUSED IN OTHER WAYS BY YOUR PARTNER OR EX-PARTNER?: NO
WITHIN THE LAST YEAR, HAVE YOU BEEN AFRAID OF YOUR PARTNER OR EX-PARTNER?: NO
HOW OFTEN DO YOU GET TOGETHER WITH FRIENDS OR RELATIVES?: TWICE A WEEK
HOW OFTEN DO YOU ATTENT MEETINGS OF THE CLUB OR ORGANIZATION YOU BELONG TO?: NEVER
WITHIN THE LAST YEAR, HAVE TO BEEN RAPED OR FORCED TO HAVE ANY KIND OF SEXUAL ACTIVITY BY YOUR PARTNER OR EX-PARTNER?: NO
DO YOU BELONG TO ANY CLUBS OR ORGANIZATIONS SUCH AS CHURCH GROUPS UNIONS, FRATERNAL OR ATHLETIC GROUPS, OR SCHOOL GROUPS?: NO
WITHIN THE LAST YEAR, HAVE YOU BEEN KICKED, HIT, SLAPPED, OR OTHERWISE PHYSICALLY HURT BY YOUR PARTNER OR EX-PARTNER?: NO
HOW OFTEN DO YOU ATTEND CHURCH OR RELIGIOUS SERVICES?: NEVER
HOW HARD IS IT FOR YOU TO PAY FOR THE VERY BASICS LIKE FOOD, HOUSING, MEDICAL CARE, AND HEATING?: NOT HARD AT ALL
ARE YOU MARRIED, WIDOWED, DIVORCED, SEPARATED, NEVER MARRIED, OR LIVING WITH A PARTNER?: LIVING WITH PARTNER

## 2021-10-05 ASSESSMENT — MIFFLIN-ST. JEOR: SCORE: 1502.91

## 2021-10-05 ASSESSMENT — LIFESTYLE VARIABLES
HOW OFTEN DO YOU HAVE A DRINK CONTAINING ALCOHOL: 2-4 TIMES A MONTH
HOW OFTEN DO YOU HAVE SIX OR MORE DRINKS ON ONE OCCASION: NEVER
HOW MANY STANDARD DRINKS CONTAINING ALCOHOL DO YOU HAVE ON A TYPICAL DAY: 1 OR 2

## 2021-10-05 ASSESSMENT — PAIN SCALES - GENERAL: PAINLEVEL: NO PAIN (0)

## 2021-10-05 NOTE — PROGRESS NOTES
SUBJECTIVE:   CC: Laine Melgar is an 26 year old woman who presents for preventive health visit.       Patient has been advised of split billing requirements and indicates understanding: Yes  Healthy Habits:     Getting at least 3 servings of Calcium per day:  Yes    Bi-annual eye exam:  NO    Dental care twice a year:  Yes    Sleep apnea or symptoms of sleep apnea:  None    Diet:  Regular (no restrictions)    Frequency of exercise:  2-3 days/week    Duration of exercise:  15-30 minutes    Taking medications regularly:  Not Applicable    Barriers to taking medications:  Not applicable    Medication side effects:  Not applicable    PHQ-2 Total Score: 0    Additional concerns today:  No          PROBLEMS TO ADD ON...  Remodeling her house.  She owns a duplex and is trying to remodel it during the pandemic.     Today's PHQ-2 Score:   PHQ-2 ( 1999 [employer]) 10/5/2021   Q1: Little interest or pleasure in doing things 0   Q2: Feeling down, depressed or hopeless 0   PHQ-2 Score 0   Q1: Little interest or pleasure in doing things -   Q2: Feeling down, depressed or hopeless -   PHQ-2 Score -       Abuse: Current or Past (Physical, Sexual or Emotional) - No  Do you feel safe in your environment? Yes    Have you ever done Advance Care Planning? (For example, a Health Directive, POLST, or a discussion with a medical provider or your loved ones about your wishes): No, advance care planning information given to patient to review.  Patient plans to discuss their wishes with loved ones or provider.      Social History     Tobacco Use     Smoking status: Never Smoker     Smokeless tobacco: Never Used     Tobacco comment: No smokers in home anymore as on 6-09.   Substance Use Topics     Alcohol use: No     Alcohol/week: 0.0 standard drinks     If you drink alcohol do you typically have >3 drinks per day or >7 drinks per week? No    Alcohol Use 8/3/2018   Prescreen: >3 drinks/day or >7 drinks/week? No   Prescreen: >3 drinks/day  or >7 drinks/week? -   No flowsheet data found.    Reviewed orders with patient.  Reviewed health maintenance and updated orders accordingly - Yes  Labs reviewed in EPIC  BP Readings from Last 3 Encounters:   10/05/21 110/62   09/10/20 100/60   10/31/19 122/78    Wt Readings from Last 3 Encounters:   10/05/21 75.8 kg (167 lb)   09/10/20 71.2 kg (157 lb)   10/31/19 71.2 kg (157 lb)                  Patient Active Problem List   Diagnosis     Other acne     Other kyphoscoliosis and scoliosis     Iron deficiency anemia     HSIL on Pap smear of cervix     Dysplasia of cervix, low grade (ANABELA 1) colpo Bx     No past surgical history on file.    Social History     Tobacco Use     Smoking status: Never Smoker     Smokeless tobacco: Never Used     Tobacco comment: No smokers in home anymore as on .   Substance Use Topics     Alcohol use: No     Alcohol/week: 0.0 standard drinks     Family History   Problem Relation Age of Onset     C.A.D. Maternal Grandfather      Lung Cancer Maternal Grandfather         Abstesosi     C.A.D. Paternal Grandfather      Diabetes Mother      Circulatory Mother      Fibromyalgia Mother      Cerebrovascular Disease Father      Hypertension Father      C.A.D. Father      Genetic Disorder Father         kidney transplant     Cancer Father         of the red blood cells,  at 57     C.A.D. Paternal Grandmother      Congenital Anomalies Other 23        Great-great Grandmother  at 23, possible Marfan's Syndrome     Cancer Brother 38        Meraz Sarcoma, first diagnosed as teenager, now has third cancer (1/2 sibling on dad's)     Breast Cancer Maternal Aunt          in 60s     Cervical Cancer Maternal Cousin      Other - See Comments Sister         1/2 sibling on dad's side, 10 years older     Other - See Comments Maternal Grandmother         78 yrs old         Current Outpatient Medications   Medication Sig Dispense Refill     TYLENOL 325 MG OR TABS Reported on 2017       Allergies    Allergen Reactions     Sulfa Drugs Hives     Recent Labs   Lab Test 19  1619 17  1415   LDL 76  --    HDL 69  --    TRIG 46  --    TSH  --  1.84        Breast Cancer Screening:  Any new diagnosis of family breast, ovarian, or bowel cancer? No    FHS-7: No flowsheet data found.    Patient under 40 years of age: Routine Mammogram Screening not recommended.   Pertinent mammograms are reviewed under the imaging tab.    History of abnormal Pap smear: NO - age 21-29 PAP every 3 years recommended  PAP / HPV Latest Ref Rng & Units 9/10/2020 2019 2017   PAP (Historical) - NIL HSIL(A) NIL   HPV16 NEG - - Negative   HPV18 NEG - - Negative   HRHPV NEG - - Negative     Reviewed and updated as needed this visit by clinical staff  Tobacco  Allergies  Meds              Reviewed and updated as needed this visit by Provider                Past Medical History:   Diagnosis Date     Dysplasia of cervix, low grade (ANABELA 1) colpo Bx 10/31/2019     Strep throat     hx scarlet fever      No past surgical history on file.  OB History    Para Term  AB Living   0 0 0 0 0 0   SAB TAB Ectopic Multiple Live Births   0 0 0 0 0       Review of Systems  CONSTITUTIONAL: NEGATIVE for fever, chills, change in weight  INTEGUMENTARU/SKIN: NEGATIVE for worrisome rashes, moles or lesions  EYES: NEGATIVE for vision changes or irritation  ENT: NEGATIVE for ear, mouth and throat problems  RESP: NEGATIVE for significant cough or SOB  BREAST: NEGATIVE for masses, tenderness or discharge  CV: NEGATIVE for chest pain, palpitations or peripheral edema  GI: NEGATIVE for nausea, abdominal pain, heartburn, or change in bowel habits  : NEGATIVE for unusual urinary or vaginal symptoms. Periods are regular.  MUSCULOSKELETAL: NEGATIVE for significant arthralgias or myalgia  NEURO: NEGATIVE for weakness, dizziness or paresthesias  PSYCHIATRIC: NEGATIVE for changes in mood or affect     OBJECTIVE:   /62   Pulse 110   Temp  "96.9  F (36.1  C) (Temporal)   Resp 18   Ht 1.666 m (5' 5.6\")   Wt 75.8 kg (167 lb)   LMP 09/11/2021 (Exact Date)   SpO2 98%   BMI 27.28 kg/m    Physical Exam  GENERAL: healthy, alert and no distress  EYES: Eyes grossly normal to inspection, PERRL and conjunctivae and sclerae normal  HENT: ear canals and TM's normal, nose and mouth without ulcers or lesions  NECK: no adenopathy, no asymmetry, masses, or scars and thyroid normal to palpation  RESP: lungs clear to auscultation - no rales, rhonchi or wheezes  BREAST: No breast exam done, we discussed self breast awareness and what to be concerned about, ie; retraction of a nipple, dimpling of the skin or any nipple discharge or aerolar rash that does not clear.   CV: regular rate and rhythm, normal S1 S2, no S3 or S4, no murmur, click or rub, no peripheral edema and peripheral pulses strong  ABDOMEN: soft, nontender, no hepatosplenomegaly, no masses and bowel sounds normal   (female): normal female external genitalia, normal urethral meatus , vaginal mucosa pink, moist, well rugated, vaginal discharge - white and thin and normal cervix, pap smear obtained without incidence.  Wet prep obtained.  No bimanual exam indicated   MS: no gross musculoskeletal defects noted, no edema  SKIN: no suspicious lesions or rashes  NEURO: Normal strength and tone, mentation intact and speech normal  PSYCH: mentation appears normal, affect normal/bright    Diagnostic Test Results:  Labs reviewed in Epic  Pending.    ASSESSMENT/PLAN:   (Z00.00) Routine general medical examination at a health care facility  (primary encounter diagnosis)  Comment: doing well, do for another pap smear  Plan: Pap screen with HPV - recommended age 30 - 65 years        Pap smear obtained without incident.  Will await results.    (Z11.4) Screening for human immunodeficiency virus without presence of risk factors  Comment: Patient agreeable to the CDC recommendation for HIV screening  Plan: HIV Antigen " "Antibody Combo        Drawn today.    (Z11.59) Encounter for hepatitis C screening test for low risk patient  Comment: Patient agreeable to the CDC recommendation for hepatitis C screening  Plan: Hepatitis C antibody        Drawn today.    (N89.8) Vaginal discharge  Comment: She had some vaginal discharge and has had bacterial vaginosis in the past.  She does not know this other symptoms other than the discharge right now.  Plan: Wet prep - Clinic Collect, Wet obtained.  If positive, we will go ahead and treat her with metronidazole.    Patient has been advised of split billing requirements and indicates understanding: Yes  COUNSELING:  Reviewed preventive health counseling, as reflected in patient instructions       Regular exercise       Healthy diet/nutrition       Immunizations    Declined: Covid vaccine, patient will continue to think about it.  She is not due for anything else       Alcohol Use       Contraception, she is not using anything at this time.       Safe sex practices/STD prevention    Estimated body mass index is 27.28 kg/m  as calculated from the following:    Height as of this encounter: 1.666 m (5' 5.6\").    Weight as of this encounter: 75.8 kg (167 lb).    Weight management plan: She has had about 10 to 15 pounds of weight gain over the past 2 years.  She is not doing enough physical activity so encouraged her to do that.    She reports that she has never smoked. She has never used smokeless tobacco.      Counseling Resources:  ATP IV Guidelines  Pooled Cohorts Equation Calculator  Breast Cancer Risk Calculator  BRCA-Related Cancer Risk Assessment: FHS-7 Tool  FRAX Risk Assessment  ICSI Preventive Guidelines  Dietary Guidelines for Americans, 2010  USDA's MyPlate  ASA Prophylaxis  Lung CA Screening    Electronically signed by:  Moises Joshi M.D.  10/5/2021    "

## 2021-10-06 ENCOUNTER — MYC MEDICAL ADVICE (OUTPATIENT)
Dept: FAMILY MEDICINE | Facility: CLINIC | Age: 26
End: 2021-10-06

## 2021-10-06 DIAGNOSIS — B37.31 YEAST INFECTION OF THE VAGINA: Primary | ICD-10-CM

## 2021-10-06 RX ORDER — FLUCONAZOLE 150 MG/1
150 TABLET ORAL ONCE
Qty: 1 TABLET | Refills: 0 | Status: SHIPPED | OUTPATIENT
Start: 2021-10-06 | End: 2021-10-06

## 2021-10-07 LAB
BKR LAB AP GYN ADEQUACY: NORMAL
BKR LAB AP GYN INTERPRETATION: NORMAL
BKR LAB AP HPV REFLEX: NORMAL
BKR LAB AP LMP: NORMAL
BKR LAB AP PREVIOUS ABNL DX: NORMAL
BKR LAB AP PREVIOUS ABNORMAL: NORMAL
PATH REPORT.COMMENTS IMP SPEC: NORMAL
PATH REPORT.RELEVANT HX SPEC: NORMAL

## 2021-10-11 LAB
HUMAN PAPILLOMA VIRUS 16 DNA: NEGATIVE
HUMAN PAPILLOMA VIRUS 18 DNA: NEGATIVE
HUMAN PAPILLOMA VIRUS FINAL DIAGNOSIS: NORMAL
HUMAN PAPILLOMA VIRUS OTHER HR: NEGATIVE

## 2021-10-12 ENCOUNTER — PATIENT OUTREACH (OUTPATIENT)
Dept: FAMILY MEDICINE | Facility: CLINIC | Age: 26
End: 2021-10-12

## 2021-12-01 ENCOUNTER — E-VISIT (OUTPATIENT)
Dept: FAMILY MEDICINE | Facility: CLINIC | Age: 26
End: 2021-12-01
Payer: COMMERCIAL

## 2021-12-01 DIAGNOSIS — N89.8 VAGINAL ITCHING: ICD-10-CM

## 2021-12-01 DIAGNOSIS — J02.9 SORE THROAT: Primary | ICD-10-CM

## 2021-12-01 PROCEDURE — 99422 OL DIG E/M SVC 11-20 MIN: CPT | Performed by: FAMILY MEDICINE

## 2021-12-08 NOTE — PATIENT INSTRUCTIONS
Thank you for choosing us for your care. Given your symptoms, I would like you to do a lab-only visit to determine what is causing them.  I have placed the orders.  Please schedule an appointment with the lab right here in [place], or call [phone].  I will let you know when the results are back and next steps to take.

## 2021-12-08 NOTE — TELEPHONE ENCOUNTER
Provider E-Visit time total (minutes): 14    Patient has covid symptoms, sore throat and fever with chills.  Should be tested for COVID-19, strep.  She also has vaginal itching so will do a lab collect wet prep    Electronically signed by:  Moises Joshi M.D.  12/8/2021

## 2022-04-20 ENCOUNTER — OFFICE VISIT (OUTPATIENT)
Dept: FAMILY MEDICINE | Facility: CLINIC | Age: 27
End: 2022-04-20
Payer: COMMERCIAL

## 2022-04-20 VITALS
WEIGHT: 170 LBS | RESPIRATION RATE: 14 BRPM | DIASTOLIC BLOOD PRESSURE: 64 MMHG | HEART RATE: 104 BPM | TEMPERATURE: 97.4 F | OXYGEN SATURATION: 98 % | BODY MASS INDEX: 27.77 KG/M2 | SYSTOLIC BLOOD PRESSURE: 118 MMHG

## 2022-04-20 DIAGNOSIS — D22.9 NEVUS: Primary | ICD-10-CM

## 2022-04-20 DIAGNOSIS — Z80.8 FH: MELANOMA: ICD-10-CM

## 2022-04-20 PROCEDURE — 99213 OFFICE O/P EST LOW 20 MIN: CPT | Performed by: FAMILY MEDICINE

## 2022-04-20 ASSESSMENT — PAIN SCALES - GENERAL: PAINLEVEL: NO PAIN (0)

## 2022-04-20 NOTE — PROGRESS NOTES
"  Assessment & Plan       ICD-10-CM    1. Nevus  D22.9    2. FH: melanoma  Z80.8      Likely benign in nature.  However since it has changed in shape, size and color and with melanoma to his father, I recommended to return for  excisional biopsy.  She agreed.     BMI:   Estimated body mass index is 27.77 kg/m  as calculated from the following:    Height as of 10/5/21: 1.666 m (5' 5.6\").    Weight as of this encounter: 77.1 kg (170 lb).   Weight management plan: Patient was referred to their PCP to discuss a diet and exercise plan.      Return in about 2 weeks (around 5/4/2022) for folow up - excise of the mole.    Geno Weston Mai, MD  [facility]    Jeniffer Jang is a 26 year old who presents for the following health issues     History of Present Illness       Reason for visit:  Possibly Abnormal mole on neck needs to be addressed.  Symptom intensity:  Mild  Had these symptoms before:  No    She eats 2-3 servings of fruits and vegetables daily.She consumes 0 sweetened beverage(s) daily.She exercises with enough effort to increase her heart rate 9 or less minutes per day.  She exercises with enough effort to increase her heart rate 3 or less days per week.   She is taking medications regularly.       Stated that she has this mole since birth but it has changed in color and shape in the last couple months. Looks darker, bigger and becomes more irregular. Her father had stage 1 melanoma that responded to treatment.   Does not wear sunblock when she goes outside.    Review of Systems   Constitutional, HEENT, cardiovascular, pulmonary, gi and gu systems are negative, except as otherwise noted.      Objective    /64   Pulse 104   Temp 97.4  F (36.3  C) (Temporal)   Resp 14   Wt 77.1 kg (170 lb)   LMP 04/06/2022   SpO2 98%   BMI 27.77 kg/m    Body mass index is 27.77 kg/m .  Physical Exam   GENERAL: alert and no distress  NECK: Supple, no lymphadenopathy or thyromegaly.  RESP: lungs " clear to auscultation - no rales, rhonchi or wheezes  CV: regular rate and rhythm, no murmur  SKIN: Offered complete skin exam today but she declined.  On the right neck posteriorly, a dark brownish to black, asymmetric, macule (0.75 mm x 0.5 mm) noted    No results found for any visits on 04/20/22.

## 2022-04-24 PROBLEM — Z80.8 FH: MELANOMA: Status: ACTIVE | Noted: 2022-04-24

## 2022-04-29 ENCOUNTER — OFFICE VISIT (OUTPATIENT)
Dept: FAMILY MEDICINE | Facility: CLINIC | Age: 27
End: 2022-04-29
Payer: COMMERCIAL

## 2022-04-29 VITALS
WEIGHT: 168 LBS | BODY MASS INDEX: 27.45 KG/M2 | OXYGEN SATURATION: 99 % | HEART RATE: 117 BPM | DIASTOLIC BLOOD PRESSURE: 66 MMHG | TEMPERATURE: 98.1 F | SYSTOLIC BLOOD PRESSURE: 108 MMHG

## 2022-04-29 DIAGNOSIS — Z80.8 FH: MELANOMA: ICD-10-CM

## 2022-04-29 DIAGNOSIS — D22.61 MELANOCYTIC NEVUS OF RIGHT SHOULDER: Primary | ICD-10-CM

## 2022-04-29 PROCEDURE — 88342 IMHCHEM/IMCYTCHM 1ST ANTB: CPT | Performed by: DERMATOLOGY

## 2022-04-29 PROCEDURE — 88305 TISSUE EXAM BY PATHOLOGIST: CPT | Performed by: DERMATOLOGY

## 2022-04-29 PROCEDURE — 88341 IMHCHEM/IMCYTCHM EA ADD ANTB: CPT | Performed by: DERMATOLOGY

## 2022-04-29 PROCEDURE — 11401 EXC TR-EXT B9+MARG 0.6-1 CM: CPT | Performed by: FAMILY MEDICINE

## 2022-04-29 ASSESSMENT — PAIN SCALES - GENERAL: PAINLEVEL: NO PAIN (0)

## 2022-04-29 NOTE — PROGRESS NOTES
Assessment & Plan       ICD-10-CM    1. Melanocytic nevus of right shoulder  D22.61 trunk, arms, legs     Surgical Pathology Exam   2. FH: melanoma - father  Z80.8 trunk, arms, legs     Surgical Pathology Exam     The mole was excised successfully excised today, no complication.  Please see the procedure note for further details.  Pending for pathology.  Wound care discussed.  Symptoms that need to be seen or call in discussed.  Tylenol or Motrin as needed for pain.  Return in a week for suture removal.        Return in about 1 week (around 5/6/2022) for suture removal.    Geno Weston Mai, MD  [facility]    Subjective     HPI     Mole removal, discuss pain medications    Laine is a 26 year old who presents for excisional biopsy of the mole on her right upper back/shoulder that has changed in color, size and shape.  She was seen for it about a week ago and please see my last dictation for further details.  She has no other concerns today.    Review of Systems   Constitutional, HEENT, cardiovascular, pulmonary, gi and gu systems are negative, except as otherwise noted.      Objective    /66   Pulse 117   Temp 98.1  F (36.7  C) (Temporal)   Wt 76.2 kg (168 lb)   LMP 04/06/2022   SpO2 99%   BMI 27.45 kg/m    Body mass index is 27.45 kg/m .  Physical Exam     SKIN: On the right neck posteriorly, a dark brownish to black, asymmetric, macule (0.75 mm x 0.5 mm) noted      No results found for any visits on 04/29/22.

## 2022-04-30 NOTE — PROCEDURES
The who procedure was discussed with patient in details.  Risk associated with the procedure was also discussed which include but not limited to infection, pain, bleeding, scaring as well as incomplete excision that may require re-excision.  Educated alternative options including referral to dermatology.  Option of monitoring also discussed.  She elected to have it remove today.     Time out performed - patient was identified time 2.  Name  and  location of the     Area was cleaned with alcohol swab and locally anesthetized with lidocaine with epi, about 3 cc were used. She has good anesthesia effect on from it.   The area was then cleaned with iodine in the usual fashion.  The whole procedure was performed in usual sterile manner.  Blade #15 was used to make an eclipse incision - 0.5x1.5 cm in dimension with no complication. The incision was then sutured with 3-0 Vicryl, run-on sutures placed. The area was then cleaned with normal saline.  Appropriate dressing applied. The procedure was well tolerated and without complications. Specimen was sent.    Post procedure and wound care discussed . Encouraged to monitor for sign of infections.  Follow-up in a week for suture removal, earlier as needed.    Geno Macias MD.

## 2022-05-05 ENCOUNTER — TELEPHONE (OUTPATIENT)
Dept: DERMATOLOGY | Facility: CLINIC | Age: 27
End: 2022-05-05
Payer: COMMERCIAL

## 2022-05-05 ENCOUNTER — VIRTUAL VISIT (OUTPATIENT)
Dept: DERMATOLOGY | Facility: CLINIC | Age: 27
End: 2022-05-05
Payer: COMMERCIAL

## 2022-05-05 DIAGNOSIS — D03.61 MELANOMA IN SITU OF RIGHT SHOULDER (H): Primary | ICD-10-CM

## 2022-05-05 DIAGNOSIS — D03.59 MELANOMA IN SITU OF TORSO EXCLUDING BREAST (H): Primary | ICD-10-CM

## 2022-05-05 LAB
PATH REPORT.COMMENTS IMP SPEC: NORMAL
PATH REPORT.FINAL DX SPEC: NORMAL
PATH REPORT.GROSS SPEC: NORMAL
PATH REPORT.MICROSCOPIC SPEC OTHER STN: NORMAL
PATH REPORT.RELEVANT HX SPEC: NORMAL
PHOTO IMAGE: NORMAL

## 2022-05-05 PROCEDURE — 99213 OFFICE O/P EST LOW 20 MIN: CPT | Mod: 95 | Performed by: DERMATOLOGY

## 2022-05-05 NOTE — LETTER
5/5/2022         RE: Laine Melgar  [address]  [place] [address]        Dear Colleague,    Thank you for referring your patient, Laine Melgar, to the [facility]. Please see a copy of my visit note below.    Dermatologic Surgery Consult Note    May 5, 2022  Start time (if telephone encounter): 2:15 p.m.  End time (if telephone encounter): 2:25 p.m.    Dermatology Problem List:  1. MIS, right shoulder, s/p shave biopsy on 4/29/22, pending WLE    Subjective: The patient is a 26 year old woman who presents today for dermatologic surgery consultation for a recent diagnosis of skin cancer.    Skin cancer(s): Melanoma in situ  Location(s): Right shoulder  Associated symptoms: None  Onset: within last 1 year    No other associated symptoms, modifying factors, or prior treatments, except when noted above. The patient denies any constitutional symptoms, lymphadenopathy, unintentional weight loss or decreased appetite. No other skin concerns today.    Objective:   No photos submitted.    Assessment and Plan:     1. Plan for dermatologic surgery for skin cancer(s) above:  - MIS, right shoulder, s/p shave biopsy on 4/29/22, pending WLE  - Emphasized that this is stage 0 melanoma fortunately. She will need skin checks every 6-12 months for the next 2 years or longer.  - We discussed the nature of the diagnosis/condition above. We discussed the treatment options, including the risks benefits and expectations of these options. We recommend WLE with 5 mm margin, and the patient agrees to proceed with this.  The patient is aware of the risks, benefits and expectations of this procedure. The patient will be scheduled for this procedure, if not already done so.  - We anticipate the following closure type: Linear closure, such as complex linear closure (CLC)    The patient was discussed with and evaluated by attending physician, Bautista Gilbert DO.    Riaz Moran MD  Micrographic Surgery and  Dermatologic Oncology (MSDO) Fellow    Staff Physician Comments:   I saw the photos and evaluated the patient with the fellow (Dr. Trav Moran) and I agree with the assessment and plan. I was present for the key portions of the telephone call.    Bautista Gilbert DO    Department of Dermatology  [facility]: Phone: [phone], Fax:[phone]  [facility]: Phone: [phone], Fax: [phone]      Again, thank you for allowing me to participate in the care of your patient.        Sincerely,        Bautista Gilbert MD

## 2022-05-05 NOTE — TELEPHONE ENCOUNTER
[facility] Call Center    Phone Message    May a detailed message be left on voicemail: yes     Reason for Call: Other: Hi, please review and schedule urgent referral from 5/5 Geno Macias MD. Recent biopsy showed melanoma in situ with positive margin that will need to be reexcised. Thanks!     Action Taken: Message routed to:  Adult Clinics: Dermatology p [id number]    Travel Screening: Not Applicable

## 2022-05-05 NOTE — TELEPHONE ENCOUNTER
Spoke with patient.  She will have a phone consult today regarding excision of MIS.    She has been scheduled for the excision on 5/23/22 at 9:30 am.

## 2022-05-06 ENCOUNTER — MYC MEDICAL ADVICE (OUTPATIENT)
Dept: FAMILY MEDICINE | Facility: CLINIC | Age: 27
End: 2022-05-06
Payer: COMMERCIAL

## 2022-05-06 NOTE — PROGRESS NOTES
Dermatologic Surgery Consult Note    May 5, 2022  Start time (if telephone encounter): 2:15 p.m.  End time (if telephone encounter): 2:25 p.m.    Dermatology Problem List:  1. MIS, right shoulder, s/p shave biopsy on 4/29/22, pending WLE    Subjective: The patient is a 26 year old woman who presents today for dermatologic surgery consultation for a recent diagnosis of skin cancer.    Skin cancer(s): Melanoma in situ  Location(s): Right shoulder  Associated symptoms: None  Onset: within last 1 year    No other associated symptoms, modifying factors, or prior treatments, except when noted above. The patient denies any constitutional symptoms, lymphadenopathy, unintentional weight loss or decreased appetite. No other skin concerns today.    Objective:   No photos submitted.    Assessment and Plan:     1. Plan for dermatologic surgery for skin cancer(s) above:  - MIS, right shoulder, s/p shave biopsy on 4/29/22, pending WLE  - Emphasized that this is stage 0 melanoma fortunately. She will need skin checks every 6-12 months for the next 2 years or longer.  - We discussed the nature of the diagnosis/condition above. We discussed the treatment options, including the risks benefits and expectations of these options. We recommend WLE with 5 mm margin, and the patient agrees to proceed with this.  The patient is aware of the risks, benefits and expectations of this procedure. The patient will be scheduled for this procedure, if not already done so.  - We anticipate the following closure type: Linear closure, such as complex linear closure (CLC)    The patient was discussed with and evaluated by attending physician, DO. Riaz Plaza MD  Micrographic Surgery and Dermatologic Oncology (MSDO) Fellow    Staff Physician Comments:   I saw the photos and evaluated the patient with the fellow (Dr. Trav Moran) and I agree with the assessment and plan. I was present for the key portions of the telephone call.    Bautista  DO [place]    Department of Dermatology  [facility]: Phone: [phone], Fax:[phone]  [facility]: Phone: [phone], Fax: [phone]

## 2022-05-20 ENCOUNTER — TELEPHONE (OUTPATIENT)
Dept: DERMATOLOGY | Facility: CLINIC | Age: 27
End: 2022-05-20
Payer: COMMERCIAL

## 2022-05-20 NOTE — TELEPHONE ENCOUNTER
Excision previsit information                                                    Excision of: melanoma in-situ  Site(s): right shoulder    Medication & Allergy Information                                                    CURRENT MEDICATIONS:   No current outpatient medications on file.     Do you take the following medications:  Coumadin, Eliquis, Pradaxa, Xarelto:   NO   -If on Coumadin, INR should be checked within 7 days of surgery.  Range should be 3.5 or less or within therapeutic range.    Do you have an ALLERGIC REACTION to any medications:  YES   Sulfa drugs    Past Medical History                                                    Do you currently or have you previously had any of the following conditions:       HIV/AIDS:  NO    Hepatitis:  NO    Suppressed immune system/Organ transplant:  NO      Autoimmune disorder (eg RA, lupus):  NO    Prolonged bleeding or bleeding disorder:  NO    History of cold sores or shingles at surgical site:  NO  If yes, notify provider for possible need for Valtrex.    Pacemaker or Defibrillator:  NO.      History of artificial or heart valve replacement:  NO    Endocarditis/Rheumatic fever: NO    Have you been told you should take antibiotic prior to dental work or surgery:  NO    Joint replacement within past 2 years: NO    Previous prosthetic joint infection: N/A    Diabetes: NO    Pregnant or Breastfeeding: NO    Keloids or abnormal scars: NO    Mobility device (wheelchair, transfer difficulty): NO    Tobacco use:  NO.      Yasmine العلي RN

## 2022-05-23 ENCOUNTER — OFFICE VISIT (OUTPATIENT)
Dept: DERMATOLOGY | Facility: CLINIC | Age: 27
End: 2022-05-23
Payer: COMMERCIAL

## 2022-05-23 VITALS — SYSTOLIC BLOOD PRESSURE: 130 MMHG | HEART RATE: 87 BPM | DIASTOLIC BLOOD PRESSURE: 81 MMHG

## 2022-05-23 DIAGNOSIS — D03.61 MELANOMA IN SITU OF UPPER ARM, RIGHT (H): ICD-10-CM

## 2022-05-23 PROCEDURE — 12032 INTMD RPR S/A/T/EXT 2.6-7.5: CPT | Performed by: DERMATOLOGY

## 2022-05-23 PROCEDURE — 88305 TISSUE EXAM BY PATHOLOGIST: CPT | Performed by: DERMATOLOGY

## 2022-05-23 PROCEDURE — 11603 EXC TR-EXT MAL+MARG 2.1-3 CM: CPT | Mod: 51 | Performed by: DERMATOLOGY

## 2022-05-23 NOTE — PATIENT INSTRUCTIONS
Excision Wound Care Instructions with Steri Strips    Possible complications of any surgical procedure are bleeding, infection, scarring, alteration in skin color and sensation, muscle weakness in the area, wound dehiscence or seperation, or recurrence of the lesion or disease. On occasion, after healing, a secondary procedure or revision may be recommended in order to obtain the best cosmetic or functional result.     After your surgery, a pressure bandage will be placed over the area that has sutures. This will help prevent bleeding. Please, follow these instructions as they will help you to prevent complications as your wound heals.    For the First 48 hours After Surgery:    Leave the pressure bandage on and keep it dry. If it should come loose, you may retape it, but do not take it off.    Relax and take it easy. Do not do any vigorous exercise, heavy lifting, or bending forward. This could cause the wound to bleed.    Post-operative pain is usually mild. You may alternate between 1000 mg of Tylenol (acetaminophen) and 400 mg of Ibuprofen every 4 hours.  Do not take more than 4,000 mg of acetaminophen and more than 3200 mg of Ibuprofen in a 24 hr period.  Avoid alcohol and vitamin E as these may increase your tendency to bleed.    You may put an ice pack around the bandaged area for 20 minutes every 2-3 hours. This may help reduce swelling, bruising, and pain. Make sure the ice pack is waterproof so that the pressure bandage does not get wet.     You may see a small amount of drainage or blood on your pressure bandage.  This is normal.  However, if drainage or bleeding continues or saturates the bandage, you will need to apply firm pressure over the bandage with a washcloth for 15 minutes. If bleeding continues after applying pressure for 15 minutes then go to the nearest emergency room.      48 Hours After Surgery:    Remove outer white bandage down to clear plastic film (Tegaderm).    Leave the clear plastic  film (Tegaderm) on for up to 2 weeks, as long as it is intact.  If it falls off prior to 2 weeks follow daily wound care below.  If it stays intact for the full 2 weeks, then remove and treat as normal, healthy skin.      Daily Wound Care (if Tegaderm and Steri-Strips fall off prior to 2 weeks):    Wash wound with a mild soap and water.  Use caution when washing the wound, be gentle and do not let the forceful shower stream hit the wound directly. DO NOT WASH WITH HYDROGEN PEROXIDE AS THIS MIGHT CAUSE THE STITCHES TO DISSOLVE FASTER THAN WHAT WE WANT.    Pat dry.    Apply Vaseline (from a new container or tube) over the suture line with a Q-tip until it is completely healed. It is very important to keep the wound continuously moist, as wounds heal best in a moist environment.    Keep the site covered until it's healed.  You can cover it with a Telfa (non-stick) dressing and tape or a band-aid until healed with normal, healthy skin.      Call Us If:    You have pain that is not controlled with Tylenol/Ibuprofen.    You have signs or symptoms of an infection, such as: fever over 100 degrees F, redness, warmth, or foul-smelling or yellow/creamy drainage from the wound.      Who should I call with questions?  [facility]: [phone]  [facility]: [phone]  For urgent needs outside of business hours call the [facility] at [phone] and ask for the dermatology resident on call

## 2022-05-23 NOTE — PROGRESS NOTES
DERMATOLOGY EXCISION PROCEDURE NOTE    Dermatology Problem List:  1. Hx of MIS  -  MIS, right shoulder, s/p excision 5/23/22    NAME OF PROCEDURE: Excision intermediate layered linear closure  Staff surgeon: Dr. Gilbert  Resident: none  Scrub Nurse: Haley Quintana LPN    PRE-OPERATIVE DIAGNOSIS:  Melanoma in situ  POST-OPERATIVE DIAGNOSIS: Same   LOCATION: Right shoulder  FINAL EXCISION SIZE(DEFECT SIZE): 2.8x2.5 cm  MARGIN: 0.5 cm  FINAL REPAIR LENGTH: 6.0 cm   ANESTHESIA: 15cc 1% lidocaine with 1:100,000 epinephrine       INDICATIONS: This patient presented with a 1.8x1.5cm Melanoma in situ. Excision was indicated. We discussed the principles of treatment and most likely complications including scarring, bleeding, infection, incomplete excision, wound dehiscence, pain, nerve damage, and recurrence. Informed consent was obtained and the patient underwent the procedure as follows:    PROCEDURE: The patient was taken to the operative suite. Time-out was performed.  The treatment area was anesthetized with 1% lidocaine with epinephrine. The area was prepped with Chlorhexidine and rinsed with sterile saline and draped with sterile towels. The lesion was delineated and excised down to subcutaneous fat in a elliptical manner. Hemostasis was obtained by electrocoagulation.     REPAIR: An intermediate layered linear closure was selected as the procedure which would maximally preserve both function and cosmesis.    After the excision of the tumor, the area was carefully undermined. Hemostasis was obtained with bipolar electrocoagulation.  Closure was oriented so that the wound was in the patient's natural skin tension lines. The subcutaneous and dermal layers were then closed with 4-0 vicryl and 4-0 monocryl sutures. The epidermis was then carefully approximated along the length of the wound using 4-0 monocryl running subcuticular sutures.     Estimated blood loss was less than 10 ml for all surgical sites. A sterile  pressure dressing was applied and wound care instructions, with a written handout, were given. The [facility] alert and ambulatory.     The patient elected for a 7 day mychart hold on pathology results to allow the clinical staff to contact them with the result.     Follow-up in 2 weeks for suture removal.     Dr. Gilbert was immediately available for the entire surgery and was physicially present for the key portions of the procedure.    Anatomic Pathology Results: pending    Clinical Follow-Up: Primary Dermatologist    Staff Involved:  Staff Only    Provider Disclosure:   The documentation recorded by the scribe accurately reflects the services I personally performed and the decisions made by me.  I personally performed the procedures today.    Bautista Gilbert DO    Department of Dermatology  [facility]: Phone: [phone], Fax:[phone]  [facility]: Phone: [phone], Fax: [phone]

## 2022-05-23 NOTE — LETTER
5/23/2022         RE: Laine Melgar  [address]  [facility] [address]        Dear Colleague,    Thank you for referring your patient, Laine Melgar, to the [facility]. Please see a copy of my visit note below.    DERMATOLOGY EXCISION PROCEDURE NOTE    Dermatology Problem List:  1. Hx of MIS  -  MIS, right shoulder, s/p excision 5/23/22    NAME OF PROCEDURE: Excision intermediate layered linear closure  Staff surgeon: Dr. Gilbert  Resident: none  Scrub Nurse: Haley Quintana LPN    PRE-OPERATIVE DIAGNOSIS:  Melanoma in situ  POST-OPERATIVE DIAGNOSIS: Same   LOCATION: Right shoulder  FINAL EXCISION SIZE(DEFECT SIZE): 2.8x2.5 cm  MARGIN: 0.5 cm  FINAL REPAIR LENGTH: 6.0 cm   ANESTHESIA: 15cc 1% lidocaine with 1:100,000 epinephrine       INDICATIONS: This patient presented with a 1.8x1.5cm Melanoma in situ. Excision was indicated. We discussed the principles of treatment and most likely complications including scarring, bleeding, infection, incomplete excision, wound dehiscence, pain, nerve damage, and recurrence. Informed consent was obtained and the patient underwent the procedure as follows:    PROCEDURE: The patient was taken to the operative suite. Time-out was performed.  The treatment area was anesthetized with 1% lidocaine with epinephrine. The area was prepped with Chlorhexidine and rinsed with sterile saline and draped with sterile towels. The lesion was delineated and excised down to subcutaneous fat in a elliptical manner. Hemostasis was obtained by electrocoagulation.     REPAIR: An intermediate layered linear closure was selected as the procedure which would maximally preserve both function and cosmesis.    After the excision of the tumor, the area was carefully undermined. Hemostasis was obtained with bipolar electrocoagulation.  Closure was oriented so that the wound was in the patient's natural skin tension lines. The subcutaneous and dermal layers were then closed  with 4-0 vicryl and 4-0 monocryl sutures. The epidermis was then carefully approximated along the length of the wound using 4-0 monocryl running subcuticular sutures.     Estimated blood loss was less than 10 ml for all surgical sites. A sterile pressure dressing was applied and wound care instructions, with a written handout, were given. The patient was [facility] from the [facility] alert and ambulatory.     The patient elected for a 7 day mychart hold on pathology results to allow the clinical staff to contact them with the result.     Follow-up in 2 weeks for suture removal.     Dr. Gilbert was immediately available for the entire surgery and was physicially present for the key portions of the procedure.    Anatomic Pathology Results: pending    Clinical Follow-Up: Primary Dermatologist    Staff Involved:  Staff Only    Provider Disclosure:   The documentation recorded by the scribe accurately reflects the services I personally performed and the decisions made by me.  I personally performed the procedures today.    Bautista Gilbert DO    Department of Dermatology  [facility]: Phone: [phone], Fax:[phone]  [facility]: Phone: [phone], Fax: [phone]          Again, thank you for allowing me to participate in the care of your patient.        Sincerely,        Bautista Gilbert MD

## 2022-05-23 NOTE — LETTER
May 23, 2022      RE: Laine Melgar  [address] [facility] [address]        To whom it may concern:    Laine Melgar is under my professional care for surgery. She  may return to work with the following: The employee is UNABLE to return to work until 5/25/2022    When the patient returns to work, the following restrictions apply until 6/6/2022:    A) Reach Above Shoulders: Occasionally (1-3 hours)  B) Lift, carry, push, and pull no more than:10 lbs. Light duty-unable to lift more than 10 pounds until  6/6/2022.      Sincerely,      Bautista Gilbert DO

## 2022-05-23 NOTE — NURSING NOTE
The following medication was given:     MEDICATION:  Lidocaine with epinephrine 1% [id number]  ROUTE: SQ  SITE: see procedure note  DOSE: 15cc  LOT #: [id number]  : [employer]  EXPIRATION DATE: 12/2022  NDC#: 5795-6576-11  Was there drug waste? 0cc  Multi-dose vial: Yes    Lay Kenny LPN  May 23, 2022    Steri strips tegaderm and pressure dressing applied to excision site on right shoulder.  Wound care instructions reviewed with patient and AVS provided.  Patient verbalized understanding.   No further questions or concerns at this time.      Lay Kenny LPN

## 2022-05-23 NOTE — NURSING NOTE
Laine Melgar's goals for this visit include:   Chief Complaint   Patient presents with     Procedure     Excision - MIS right shoulder       She requests these members of her care team be copied on today's visit information: n/a    PCP: Moises Joshi    Referring Provider:  No referring provider defined for this encounter.    LMP 04/06/2022     Do you need any medication refills at today's visit? No  Yasmine العلي RN

## 2022-05-24 ENCOUNTER — TELEPHONE (OUTPATIENT)
Dept: DERMATOLOGY | Facility: CLINIC | Age: 27
End: 2022-05-24
Payer: COMMERCIAL

## 2022-05-24 NOTE — TELEPHONE ENCOUNTER
SUBJECTIVE/OBJECTIVE:                                                    Laine is a 26 year old female who is 1 day s/p excision for MIS on right posterior shoulder.     ASSESSMENT:                                                       NURSING ASSESSMENT:     Wound location: right posterior shoulder       What are you doing to manage your pain?  Tylenol    Is it helping?  Yes    Are you applying ice? yes    Have you had any noticeable bleeding through the bandage?  No, dressing is dry and intact.    Do you have any concerns?  None     PLAN:                                                       Wound care directions reviewed.  Pt declined post op appointment.  Next skin check has been scheduled.     Yasmine العلي RN

## 2022-05-26 LAB
PATH REPORT.COMMENTS IMP SPEC: NORMAL
PATH REPORT.COMMENTS IMP SPEC: NORMAL
PATH REPORT.FINAL DX SPEC: NORMAL
PATH REPORT.GROSS SPEC: NORMAL
PATH REPORT.MICROSCOPIC SPEC OTHER STN: NORMAL
PATH REPORT.RELEVANT HX SPEC: NORMAL

## 2022-06-06 ENCOUNTER — TELEPHONE (OUTPATIENT)
Dept: DERMATOLOGY | Facility: CLINIC | Age: 27
End: 2022-06-06
Payer: COMMERCIAL

## 2022-06-06 NOTE — TELEPHONE ENCOUNTER
No show skin exam, hx of melanoma, will route to nursing to reschedule    Rach mcdonnell Procedure   Dermatology, General and Plastic Surgery, Urology Specialties   [facility] [place]- [address] [address]

## 2022-09-02 ENCOUNTER — TELEPHONE (OUTPATIENT)
Dept: FAMILY MEDICINE | Facility: CLINIC | Age: 27
End: 2022-09-02

## 2022-09-02 NOTE — TELEPHONE ENCOUNTER
Reason for Call:  Other appointment    Detailed comments: Patient is looking for an appointment for an annual exam and pap smear during the time frame of 09/07-10/7. She is open to also seeing Dr Macias and needs an afternoon appointment.     Her first choice is Dr Joshi and second is Dr Macias    Phone Number Patient can be reached at: Home number on file [phone] (home)    Best Time: Anytime    Can we leave a detailed message on this number? YES    Call taken on 9/2/2022 at 5:19 AM by Tammy Mora

## 2022-09-02 NOTE — TELEPHONE ENCOUNTER
Attempted to call patient -  full    Sent [employer] messaage informing the patient that physicals are not able to be worked in at this time. Encouraged patient to call or use [employer] for next available.

## 2022-10-05 ASSESSMENT — ENCOUNTER SYMPTOMS
CHILLS: 0
HEMATURIA: 0
PARESTHESIAS: 0
MYALGIAS: 0
ARTHRALGIAS: 0
JOINT SWELLING: 0
PALPITATIONS: 0
FREQUENCY: 0
NAUSEA: 0
DYSURIA: 0
FEVER: 0
EYE PAIN: 0
NERVOUS/ANXIOUS: 0
DIARRHEA: 0
CONSTIPATION: 0
HEMATOCHEZIA: 0
HEADACHES: 0
SORE THROAT: 0
HEARTBURN: 0
WEAKNESS: 0
DIZZINESS: 0
BREAST MASS: 0
SHORTNESS OF BREATH: 0
COUGH: 0
ABDOMINAL PAIN: 0

## 2022-10-11 ENCOUNTER — OFFICE VISIT (OUTPATIENT)
Dept: FAMILY MEDICINE | Facility: CLINIC | Age: 27
End: 2022-10-11
Payer: COMMERCIAL

## 2022-10-11 VITALS
DIASTOLIC BLOOD PRESSURE: 70 MMHG | OXYGEN SATURATION: 99 % | BODY MASS INDEX: 29.57 KG/M2 | TEMPERATURE: 96.4 F | HEART RATE: 80 BPM | HEIGHT: 66 IN | RESPIRATION RATE: 14 BRPM | WEIGHT: 184 LBS | SYSTOLIC BLOOD PRESSURE: 118 MMHG

## 2022-10-11 DIAGNOSIS — Z86.006 HX OF MELANOMA IN SITU: ICD-10-CM

## 2022-10-11 DIAGNOSIS — Z00.00 ROUTINE GENERAL MEDICAL EXAMINATION AT A HEALTH CARE FACILITY: Primary | ICD-10-CM

## 2022-10-11 DIAGNOSIS — N87.0 DYSPLASIA OF CERVIX, LOW GRADE (CIN 1): ICD-10-CM

## 2022-10-11 DIAGNOSIS — D50.9 IRON DEFICIENCY ANEMIA, UNSPECIFIED IRON DEFICIENCY ANEMIA TYPE: ICD-10-CM

## 2022-10-11 LAB
ANION GAP SERPL CALCULATED.3IONS-SCNC: 5 MMOL/L (ref 3–14)
BASOPHILS # BLD AUTO: 0.1 10E3/UL (ref 0–0.2)
BASOPHILS NFR BLD AUTO: 1 %
BUN SERPL-MCNC: 9 MG/DL (ref 7–30)
CALCIUM SERPL-MCNC: 8.9 MG/DL (ref 8.5–10.1)
CHLORIDE BLD-SCNC: 106 MMOL/L (ref 94–109)
CO2 SERPL-SCNC: 27 MMOL/L (ref 20–32)
CREAT SERPL-MCNC: 0.85 MG/DL (ref 0.52–1.04)
EOSINOPHIL # BLD AUTO: 0.1 10E3/UL (ref 0–0.7)
EOSINOPHIL NFR BLD AUTO: 2 %
ERYTHROCYTE [DISTWIDTH] IN BLOOD BY AUTOMATED COUNT: 12.2 % (ref 10–15)
GFR SERPL CREATININE-BSD FRML MDRD: >90 ML/MIN/1.73M2
GLUCOSE BLD-MCNC: 91 MG/DL (ref 70–99)
HCT VFR BLD AUTO: 40.7 % (ref 35–47)
HGB BLD-MCNC: 14.1 G/DL (ref 11.7–15.7)
IMM GRANULOCYTES # BLD: 0 10E3/UL
IMM GRANULOCYTES NFR BLD: 0 %
LYMPHOCYTES # BLD AUTO: 1.9 10E3/UL (ref 0.8–5.3)
LYMPHOCYTES NFR BLD AUTO: 27 %
MCH RBC QN AUTO: 31.3 PG (ref 26.5–33)
MCHC RBC AUTO-ENTMCNC: 34.6 G/DL (ref 31.5–36.5)
MCV RBC AUTO: 90 FL (ref 78–100)
MONOCYTES # BLD AUTO: 0.6 10E3/UL (ref 0–1.3)
MONOCYTES NFR BLD AUTO: 8 %
NEUTROPHILS # BLD AUTO: 4.5 10E3/UL (ref 1.6–8.3)
NEUTROPHILS NFR BLD AUTO: 62 %
NRBC # BLD AUTO: 0 10E3/UL
NRBC BLD AUTO-RTO: 0 /100
PLATELET # BLD AUTO: 272 10E3/UL (ref 150–450)
POTASSIUM BLD-SCNC: 3.5 MMOL/L (ref 3.4–5.3)
RBC # BLD AUTO: 4.5 10E6/UL (ref 3.8–5.2)
SODIUM SERPL-SCNC: 138 MMOL/L (ref 133–144)
WBC # BLD AUTO: 7.2 10E3/UL (ref 4–11)

## 2022-10-11 PROCEDURE — 85025 COMPLETE CBC W/AUTO DIFF WBC: CPT | Performed by: FAMILY MEDICINE

## 2022-10-11 PROCEDURE — 99395 PREV VISIT EST AGE 18-39: CPT | Performed by: FAMILY MEDICINE

## 2022-10-11 PROCEDURE — 36415 COLL VENOUS BLD VENIPUNCTURE: CPT | Performed by: FAMILY MEDICINE

## 2022-10-11 PROCEDURE — 80048 BASIC METABOLIC PNL TOTAL CA: CPT | Performed by: FAMILY MEDICINE

## 2022-10-11 PROCEDURE — G0145 SCR C/V CYTO,THINLAYER,RESCR: HCPCS | Performed by: FAMILY MEDICINE

## 2022-10-11 PROCEDURE — 87624 HPV HI-RISK TYP POOLED RSLT: CPT | Performed by: FAMILY MEDICINE

## 2022-10-11 ASSESSMENT — ENCOUNTER SYMPTOMS
ARTHRALGIAS: 0
SORE THROAT: 0
WEAKNESS: 0
DIARRHEA: 0
COUGH: 0
BREAST MASS: 0
HEMATOCHEZIA: 0
CHILLS: 0
EYE PAIN: 0
FREQUENCY: 0
NAUSEA: 0
DIZZINESS: 0
HEMATURIA: 0
PALPITATIONS: 0
ABDOMINAL PAIN: 0
MYALGIAS: 0
CONSTIPATION: 0
JOINT SWELLING: 0
NERVOUS/ANXIOUS: 0
HEADACHES: 0
FEVER: 0
PARESTHESIAS: 0
HEARTBURN: 0
SHORTNESS OF BREATH: 0
DYSURIA: 0

## 2022-10-11 ASSESSMENT — PAIN SCALES - GENERAL: PAINLEVEL: NO PAIN (0)

## 2022-10-11 NOTE — PROGRESS NOTES
SUBJECTIVE:   CC: Laine is an 27 year old who presents for preventive health visit.       Patient has been advised of split billing requirements and indicates understanding: Yes  Healthy Habits:     Getting at least 3 servings of Calcium per day:  Yes    Bi-annual eye exam:  Yes    Dental care twice a year:  Yes    Sleep apnea or symptoms of sleep apnea:  None    Diet:  Carbohydrate counting    Frequency of exercise:  2-3 days/week    Duration of exercise:  15-30 minutes    Taking medications regularly:  Yes    PHQ-2 Total Score: 0    Additional concerns today:  No      Laine is here today for physical and general follow-up.  Overall she is doing well, no specific concerns today.  Since her last physical exam last year, she has been seeing a dermatologist for a melanoma in situ which was excised completely.  Her father also had melanoma.  She has been checking her skin regularly.  She is also been seeing dermatologist for skin check regularly as well    Been sexually active, not on any form of birth control by choice.  Will be happy if she is pregnant.  Never been pregnant before.  Last menstrual period was 10/5/2022 and it was normal.  Her period has been regular, no dysmenorrhea or menorrhagia.  No concern of STD.  Her Pap smear in 2019 show LSIL, colposcopy showed ANABELA-1.  Her Pap smear in 2020, 2021 and 2003 have been normal.      No headache, dizziness, or acute visual change. No runny nose, nasal congestion, coughing, fever or chill.  No CP/SOB. No N/V/D/C or problem with urination.  No abdominal pain.  No leg swelling or pain.  Not exercising.   Social alcohol drinker, but no drug or tobacco smoking.  No problem with sleeping - no depression or anxiety.  Feels safe. No other concern today.       Today's PHQ-2 Score:   PHQ-2 ( 1999 Pfizer) 10/5/2022   Q1: Little interest or pleasure in doing things 0   Q2: Feeling down, depressed or hopeless 0   PHQ-2 Score 0   PHQ-2 Total Score (12-17 Years)- Positive  if 3 or more points; Administer PHQ-A if positive -   Q1: Little interest or pleasure in doing things Not at all   Q2: Feeling down, depressed or hopeless Not at all   PHQ-2 Score 0       Abuse: Current or Past (Physical, Sexual or Emotional) - No  Do you feel safe in your environment? Yes        Social History     Tobacco Use     Smoking status: Never     Smokeless tobacco: Never     Tobacco comments:     No smokers in home anymore as on .   Substance Use Topics     Alcohol use: No     Comment: Very occasionally 2x a month         Alcohol Use 10/5/2022   Prescreen: >3 drinks/day or >7 drinks/week? No   Prescreen: >3 drinks/day or >7 drinks/week? -   No flowsheet data found.    Reviewed orders with patient.  Reviewed health maintenance and updated orders accordingly - Yes  Patient Active Problem List   Diagnosis     Other acne     Other kyphoscoliosis and scoliosis     Iron deficiency anemia     HSIL on Pap smear of cervix     Dysplasia of cervix, low grade (ANABELA 1) colpo Bx     FH: melanoma - father     Hx of melanoma in situ     Past Surgical History:   Procedure Laterality Date     SKIN CANCER EXCISION      contained melenoma       Social History     Tobacco Use     Smoking status: Never     Smokeless tobacco: Never     Tobacco comments:     No smokers in home anymore as on .   Substance Use Topics     Alcohol use: No     Comment: Very occasionally 2x a month     Family History   Problem Relation Age of Onset     Diabetes Mother      Circulatory Mother      Fibromyalgia Mother      Osteoarthritis Mother      Kidney Disease Father      Cerebrovascular Disease Father      Hypertension Father      C.A.D. Father      Genetic Disorder Father         kidney transplant     Cancer Father         of the red blood cells,  at 57     No Known Problems Maternal Grandmother      Pulmonary fibrosis Maternal Grandfather      C.A.D. Maternal Grandfather      Lung Cancer Maternal Grandfather         Abstesosi     Cancer  Paternal Grandmother 70         of cancer     C.A.D. Paternal Grandmother      Coronary Artery Disease Paternal Grandfather      Hypertension Paternal Grandfather      C.A.D. Paternal Grandfather      Cancer Brother 38        Meraz Sarcoma, first diagnosed as teenager, now has third cancer (1/2 sibling on dad's)     No Known Problems Brother      No Known Problems Brother      Other - See Comments Sister         1/2 sibling on dad's side, 10 years older     Breast Cancer Maternal Aunt          in 60s     Cervical Cancer Maternal Cousin      Congenital Anomalies Other 23        Great-great Grandmother  at 23, possible Marfan's Syndrome         No current outpatient medications on file.     Allergies   Allergen Reactions     Sulfa Drugs Hives       Breast Cancer Screening:    FHS-7:   Breast CA Risk Assessment (FHS-7) 10/5/2022   Did any of your first-degree relatives have breast or ovarian cancer? Unknown   Did any of your relatives have bilateral breast cancer? Yes   Did any man in your family have breast cancer? Yes   Did any woman in your family have breast and ovarian cancer? Yes   Did any woman in your family have breast cancer before age 50 y? No   Do you have 2 or more relatives with breast and/or ovarian cancer? Yes   Do you have 2 or more relatives with breast and/or bowel cancer? No     I reviewed the medical history with patient, her father  of leukemia, paternal grandmother  of cancer but not sure what type.  She has a brother who  of Jorge sarcoma.  There is no family history of breast or ovarian cancer.    Patient under 40 years of age: Routine Mammogram Screening not recommended.   Pertinent mammograms are reviewed under the imaging tab.    History of abnormal Pap smear: YES - updated in Problem List and Health Maintenance accordingly  PAP / HPV Latest Ref Rng & Units 10/11/2022 10/5/2021 9/10/2020   PAP   Negative for Intraepithelial Lesion or Malignancy (NILM) Negative for  "Intraepithelial Lesion or Malignancy (NILM) -   PAP (Historical) - - - NIL   HPV16 Negative Negative Negative -   HPV18 Negative Negative Negative -   HRHPV Negative Negative Negative -     Reviewed and updated as needed this visit by clinical staff   Tobacco  Allergies  Meds  Problems  Med Hx  Surg Hx  Fam Hx  Soc   Hx        Reviewed and updated as needed this visit by Provider   Tobacco  Allergies  Meds  Problems  Med Hx  Surg Hx  Fam Hx  Soc   Hx           Review of Systems   Constitutional: Negative for chills and fever.   HENT: Negative for congestion, ear pain, hearing loss and sore throat.    Eyes: Negative for pain and visual disturbance.   Respiratory: Negative for cough and shortness of breath.    Cardiovascular: Negative for chest pain, palpitations and peripheral edema.   Gastrointestinal: Negative for abdominal pain, constipation, diarrhea, heartburn, hematochezia and nausea.   Breasts:  Negative for tenderness, breast mass and discharge.   Genitourinary: Positive for vaginal discharge. Negative for dysuria, frequency, genital sores, hematuria, pelvic pain, urgency and vaginal bleeding.   Musculoskeletal: Negative for arthralgias, joint swelling and myalgias.   Skin: Negative for rash.   Neurological: Negative for dizziness, weakness, headaches and paresthesias.   Psychiatric/Behavioral: Negative for mood changes. The patient is not nervous/anxious.           OBJECTIVE:   /70   Pulse 80   Temp (!) 96.4  F (35.8  C) (Temporal)   Resp 14   Ht 1.666 m (5' 5.6\")   Wt 83.5 kg (184 lb)   LMP 10/06/2022   SpO2 99%   BMI 30.06 kg/m    Physical Exam   GENERAL: healthy, alert and no distress.  Speaking in full sentences.  EYES: Eyes grossly normal to inspection, PERRL and conjunctivae and sclerae normal.  No nystagmus.  All 4 visual fields intact.  HENT: ear canals and TM's normal.  Nares are non-congested. Oropharynx is pink and moist. No tender with palpation to the sinuses.   NECK: " no adenopathy, supple, no lymphadenopathy or thyromegaly.  No tender with palpation to the cervical spine or its paraspinous muscle.  RESP: lungs clear to auscultation - no rales, rhonchi or wheezes.  Good respiratory effort throughout.  BREAST:   CV: regular rate and rhythm, no murmur  ABDOMEN: soft, nontender, nondistended, no palpable masses organomegaly with normal culture.  No CVA tenderness.   (female): Normal female external genitalia, normal urethral meatus, vaginal mucosa, normal cervix/adnexa/uterus without masses or discharge. Pending for pap and HPV.  MS: no gross musculoskeletal defects noted, no edema. All joints are in the normal range of motion and 4 extremities were equally in strength. Hips, knees, ankles, shoulders, elbows, wrists exams were normal. Fine motor skills of the fingers are intact. Back is straight, no tender with palpation to the spine.  SKIN: no suspicious lesions or rashes.  Very light skin with freckles throughout her body.  NEURO: Normal strength and tone, mentation intact and speech normal.  Cranial nerve II through XII intact.  DTRs +2 throughout.  No focal neurological deficit.  PSYCH: mentation appears normal, affect normal/bright.  Thoughts intact, suicidal/homicidal ideation.  No hallucination.      Diagnostic Test Results:  Labs reviewed in Epic  Results for orders placed or performed in visit on 10/11/22   Basic metabolic panel  (Ca, Cl, CO2, Creat, Gluc, K, Na, BUN)     Status: Normal   Result Value Ref Range    Sodium 138 133 - 144 mmol/L    Potassium 3.5 3.4 - 5.3 mmol/L    Chloride 106 94 - 109 mmol/L    Carbon Dioxide (CO2) 27 20 - 32 mmol/L    Anion Gap 5 3 - 14 mmol/L    Urea Nitrogen 9 7 - 30 mg/dL    Creatinine 0.85 0.52 - 1.04 mg/dL    Calcium 8.9 8.5 - 10.1 mg/dL    Glucose 91 70 - 99 mg/dL    GFR Estimate >90 >60 mL/min/1.73m2   CBC with platelets and differential     Status: None   Result Value Ref Range    WBC Count 7.2 4.0 - 11.0 10e3/uL    RBC Count 4.50  3.80 - 5.20 10e6/uL    Hemoglobin 14.1 11.7 - 15.7 g/dL    Hematocrit 40.7 35.0 - 47.0 %    MCV 90 78 - 100 fL    MCH 31.3 26.5 - 33.0 pg    MCHC 34.6 31.5 - 36.5 g/dL    RDW 12.2 10.0 - 15.0 %    Platelet Count 272 150 - 450 10e3/uL    % Neutrophils 62 %    % Lymphocytes 27 %    % Monocytes 8 %    % Eosinophils 2 %    % Basophils 1 %    % Immature Granulocytes 0 %    NRBCs per 100 WBC 0 <1 /100    Absolute Neutrophils 4.5 1.6 - 8.3 10e3/uL    Absolute Lymphocytes 1.9 0.8 - 5.3 10e3/uL    Absolute Monocytes 0.6 0.0 - 1.3 10e3/uL    Absolute Eosinophils 0.1 0.0 - 0.7 10e3/uL    Absolute Basophils 0.1 0.0 - 0.2 10e3/uL    Absolute Immature Granulocytes 0.0 <=0.4 10e3/uL    Absolute NRBCs 0.0 10e3/uL   HPV High Risk Types DNA Cervical     Status: None   Result Value Ref Range    Other HR HPV Negative Negative    HPV16 DNA Negative Negative    HPV18 DNA Negative Negative    FINAL DIAGNOSIS       This patient's sample is negative for HPV DNA.        This test was developed and its performance characteristics determined by the [facility]. It has not been cleared or approved by the FDA. The laboratory is regulated under CLIA as qualified to perform high-complexity testing. This test is used for clinical purposes. It should not be regarded as investigational or for research.    METHODOLOGY: The Roche Kori 4800 system uses automated extraction, simultaneous amplification of HPV (L1 region) and beta-globin, followed by real time detection of fluorescent labeled HPV and beta globin using specific oligonucleotide probes. The test specifically identifies types HPV 16 DNA and HPV 18 DNA while concurrently detecting the rest of the high risk types (31, 33, 35, 39, 45, 51, 52, 56, 58, 59, 66 or 68).    COMMENTS: This test is not intended for use as a screening device for woman under age 30 with normal cervical cytology. Results should be correlated with cytologic and  histologic findings. Close clinical followup is recommended.       PAP screen reflex to HPV if ASCUS - recommended age 25 - 29 years     Status: None   Result Value Ref Range    Interpretation        Negative for Intraepithelial Lesion or Malignancy (NILM)    Comment         Papanicolaou Test Limitations:  Cervical cytology is a screening test with limited sensitivity, and regular screening is critical for cancer prevention.  Pap tests are primarily effective for the diagnosis/prevention of squamous cell carcinoma, not adenocarcinoma or other cancers.        Specimen Adequacy       Satisfactory for evaluation, endocervical/transformation zone component present    Clinical Information       none      Reflex Testing Yes if ASCUS     Previous Abnormal?       No      Performing Labs       The technical component of this testing was completed at [facility]     CBC with platelets and differential     Status: None    Narrative    The following orders were created for panel order CBC with platelets and differential.  Procedure                               Abnormality         Status                     ---------                               -----------         ------                     CBC with platelets and d...[[id number]]                      Final result                 Please view results for these tests on the individual orders.       ASSESSMENT/PLAN:   (Z00.00) Routine general medical examination at a health care facility  (primary encounter diagnosis)  Comment: Generally Laine is healthy.  Discussed about safety issue, healthy diet, exercising, weight management, good sleeping hygiene, contraceptions option, advanced care planning, safe sex and STD prevention, substance/alcohol misuse prevention, domestic violence and depression prevention.  UTD for immunization.  Pending for pap and HPV.  Labs as ordered.  All of her questions were answered.      She  "was also recommended to start a prenatal vitamin daily since she is sexually reactive with no reliable form of birth control.    Plan: PAP screen reflex to HPV if ASCUS - recommended        age 25 - 29 years, Basic metabolic panel  (Ca,         Cl, CO2, Creat, Gluc, K, Na, BUN), CBC with         platelets and differential, HPV Hold (Lab         Only), HPV High Risk Types DNA Cervical            (N87.0) Dysplasia of cervix, low grade (ANABELA 1) colpo Bx  Comment: Her Pap smear in 2019 show LSIL, colposcopy showed ANABELA-1.  Her Pap smear in 2020 and 2021 were normal.  Repeat the Pap smear with HPV in a year, if it is normal with negative HR HPV then repeat in 3 years.      (D50.9) Iron deficiency anemia, unspecified iron deficiency anemia type  Comment: CBC was normal, no anemia.  Food with high source of iron discussed and recommended.      (Z86.006) Hx of melanoma in situ  Comment: Completely excised.  Follow-up with the dermatologist as per his/her recommendation.  Her father also had melanoma.  Emphasized the importance of skin check at least annually with dermatologist.  Also instructed follow-up if there are any change in size, shape shape or color on any lesion.  Emphasized the importance of using sunblock.      Patient has been advised of split billing requirements and indicates understanding: No    COUNSELING:  Reviewed preventive health counseling, as reflected in patient instructions       Regular exercise       Healthy diet/nutrition       Alcohol Use       Contraception       Family planning       Folic Acid       Safe sex practices/STD prevention       Advance Care Planning    Estimated body mass index is 30.06 kg/m  as calculated from the following:    Height as of this encounter: 1.666 m (5' 5.6\").    Weight as of this encounter: 83.5 kg (184 lb).    Weight management plan: Discussed healthy diet and exercise guidelines    She reports that she has never smoked. She has never used smokeless " tobacco.      Counseling Resources:  ATP IV Guidelines  Pooled Cohorts Equation Calculator  Breast Cancer Risk Calculator  BRCA-Related Cancer Risk Assessment: FHS-7 Tool  FRAX Risk Assessment  ICSI Preventive Guidelines  Dietary Guidelines for Americans, 2010  USDA's MyPlate  ASA Prophylaxis  Lung CA Screening    Geno Weston Mai, MD  [facility]

## 2022-10-12 ENCOUNTER — OFFICE VISIT (OUTPATIENT)
Dept: DERMATOLOGY | Facility: CLINIC | Age: 27
End: 2022-10-12
Payer: COMMERCIAL

## 2022-10-12 VITALS
SYSTOLIC BLOOD PRESSURE: 119 MMHG | OXYGEN SATURATION: 100 % | TEMPERATURE: 97.7 F | HEART RATE: 65 BPM | DIASTOLIC BLOOD PRESSURE: 75 MMHG

## 2022-10-12 DIAGNOSIS — Z85.820 HISTORY OF MELANOMA: Primary | ICD-10-CM

## 2022-10-12 DIAGNOSIS — L82.1 SEBORRHEIC KERATOSES: ICD-10-CM

## 2022-10-12 DIAGNOSIS — D22.9 MULTIPLE BENIGN NEVI: ICD-10-CM

## 2022-10-12 DIAGNOSIS — D18.01 CHERRY ANGIOMA: ICD-10-CM

## 2022-10-12 PROCEDURE — 99213 OFFICE O/P EST LOW 20 MIN: CPT | Performed by: DERMATOLOGY

## 2022-10-12 NOTE — NURSING NOTE
Laine Melgar's goals for this visit include:   Chief Complaint   Patient presents with     Derm Problem     FBSC, hx of MIS, no area's of concern today       She requests these members of her care team be copied on today's visit information: yes    PCP: Moises Joshi    Referring Provider:  Referred Self, MD  No address on file    /75   Pulse 65   Temp 97.7  F (36.5  C)   LMP 10/06/2022   SpO2 100%     Do you need any medication refills at today's visit? No    Haleigh Forde LPN

## 2022-10-12 NOTE — PATIENT INSTRUCTIONS
Patient Education     Checking for Skin Cancer  You can find cancer early by checking your skin each month. There are 3 kinds of skin cancer. They are melanoma, basal cell carcinoma, and squamous cell carcinoma. Doing monthly skin checks is the best way to find new marks or skin changes. Follow the instructions below for checking your skin.   The ABCDEs of checking moles for melanoma   Check your moles or growths for signs of melanoma using ABCDE:   Asymmetry: the sides of the mole or growth don t match  Border: the edges are ragged, notched, or blurred  Color: the color within the mole or growth varies  Diameter: the mole or growth is larger than 6 mm (size of a pencil eraser)  Evolving: the size, shape, or color of the mole or growth is changing (evolving is not shown in the images below)    Checking for other types of skin cancer  Basal cell carcinoma or squamous cell carcinoma have symptoms such as:     A spot or mole that looks different from all other marks on your skin  Changes in how an area feels, such as itching, tenderness, or pain  Changes in the skin's surface, such as oozing, bleeding, or scaliness  A sore that does not heal  New swelling or redness beyond the border of a mole    Who s at risk?  Anyone can get skin cancer. But you are at greater risk if you have:   Fair skin, light-colored hair, or light-colored eyes  Many moles or abnormal moles on your skin  A history of sunburns from sunlight or tanning beds  A family history of skin cancer  A history of exposure to radiation or chemicals  A weakened immune system  If you have had skin cancer in the past, you are at risk for recurring skin cancer.   How to check your skin  Do your monthly skin checkups in front of a full-length mirror. Check all parts of your body, including your:   Head (ears, face, neck, and scalp)  Torso (front, back, and sides)  Arms (tops, undersides, upper, and lower armpits)  Hands (palms, backs, and fingers, including under  the nails)  Buttocks and genitals  Legs (front, back, and sides)  Feet (tops, soles, toes, including under the nails, and between toes)  If you have a lot of moles, take digital photos of them each month. Make sure to take photos both up close and from a distance. These can help you see if any moles change over time.   Most skin changes are not cancer. But if you see any changes in your skin, call your doctor right away. Only he or she can diagnose a problem. If you have skin cancer, seeing your doctor can be the first step toward getting the treatment that could save your life.   [employer] last reviewed this educational content on 4/1/2019 2000-2020 [employer]. [address] [place] [address], [place] [address]. All rights reserved. This information is not intended as a substitute for professional medical care. Always follow your healthcare professional's instructions.       When should I call my doctor?  If you are worsening or not improving, please, contact us or seek urgent care as noted below.     Who should I call with questions (adults)?  [facility] (adult and pediatric): [phone]  [facility] (adult): [phone]  For urgent needs outside of business hours call the [facility] at [phone] and ask for the dermatology resident on call to be paged  If this is a medical emergency and you are unable to reach an ER, Call [phone]    Who should I call with questions (pediatric)?  [facility]- Pediatric Dermatology  Dr. Maeve Plascencia, Dr. Leonor Elliott, Dr. Leigh Ann Mcdowell, KATARINA Galvan, Dr. Carley Martínez, Dr. Sharyn Orellana & Dr. Cruz Austin  Non-urgent nurse triage line; [phone]- Sivan and Jodi JARRELL Care Coordinators   [place]illy (/Complex ) [phone]    If you need a prescription refill, please contact your pharmacy. Refills are approved or denied by our Physicians  during normal business hours, Monday through Fridays  Per office policy, refills will not be granted if you have not been seen within the past year (or sooner depending on your child's condition)    Scheduling Information:  Pediatric Appointment Scheduling and Call Center [phone]  Radiology Scheduling- [phone]  Sedation Unit Scheduling- [phone]  [place] Scheduling- General [phone]; Pediatric Dermatology [phone]  Main  Services: [phone]  Citizen of [place]: [phone]  Norwegian: [phone]  Hmong/[place]: [phone]  Preadmission Nursing Department Fax Number: [phone] (Fax all pre-operative paperwork to this number)    For urgent matters arising during evenings, weekends, or holidays that cannot wait for normal business hours please call [phone] and ask for the dermatology resident on call to be paged.

## 2022-10-12 NOTE — LETTER
10/12/2022         RE: Laine Melgar  [address]  [facility] [address]        Dear Colleague,    Thank you for referring your patient, Laine Melgar, to the [facility]. Please see a copy of my visit note below.    [facility] Dermatology Note  Encounter Date: Oct 12, 2022  Office Visit     Dermatology Problem List:  Last skin check 10/12/22, due every 3 months  1. Hx of MIS  -  MIS - right shoulder, s/p excision 5/23/22    Family history: Father had melanoma.   ____________________________________________    Assessment & Plan:    1. History of melanoma in situ, no clinical evidence of recurrence.  - ABCDEs: Counseled ABCDEs of melanoma: Asymmetry, Border (irregularity), Color (not uniform, changes in color), Diameter (greater than 6 mm which is about the size of a pencil eraser), and Evolving (any changes in preexisting moles).  - Sun protection: Counseled SPF30+ sunscreen, UPF clothing, sun avoidance, tanning bed avoidance.   - Recommended yearly dental, ophthalmology, and gynecology exams.  - Recommended skin exams for all first-degree relatives   - Recommended follow up in 3 months     2. Multiple clinically banal-appearing melanocytic nevi: Chronic, stable  - No further intervention required. Patient to report changes.   - Patient reassured of the benign nature of these lesions.    3. Benign findings including: seborrheic keratoses, cherry angioma: minor, self limited problems.  - No further intervention required. Patient to report changes.   - Patient reassured of the benign nature of these lesions.      Procedures Performed:   None.      Follow-up: 3 month(s) in-person for a skin check, or earlier for new or changing lesions    Staff and Scribe:     Scribe Disclosure:   I, Young Gray, am serving as a scribe to document services personally performed by this physician, Dr. Cruz Holbrook, based on data collection and the provider's statements to me.        Provider Disclosure:   The documentation recorded by the scribe accurately reflects the services I personally performed and the decisions made by me.    Cruz Holbrook MD   of Dermatology  Department of Dermatology  [facility]      ____________________________________________    CC: Derm Problem (FBSC, hx of MIS, no area's of concern today)    HPI:  Ms. Laine Melgar is a(n) 27 year old female who presents today as a return patient for a skin check.    Last seen 5/23/22 by Dr. Gilbert for excision of a melanoma in situ on the right shoulder.     Today, she has no areas of concern.    Patient is otherwise feeling well, without additional skin concerns.    Labs Reviewed:  N/A    Physical Exam:  Vitals: LMP 10/06/2022   SKIN: Total skin excluding the undergarment areas was performed. The exam included the head/face, neck, both arms, chest, back, abdomen, both legs, digits and/or nails.   - Well healed scar at site of previous melanoma, no repigmentation or nodularity noted.    - There are dome shaped bright red papules on the trunk and extremities.   - Multiple regular brown pigmented macules and papules are identified on the trunk and extremities.   - Scattered brown macules on sun exposed areas.  - There are waxy stuck on tan to brown papules on the trunk and extremities.    - No other lesions of concern on areas examined.     Medications:  No current outpatient medications on file.     No current facility-administered medications for this visit.      Past Medical History:   Patient Active Problem List   Diagnosis     Other acne     Other kyphoscoliosis and scoliosis     Iron deficiency anemia     HSIL on Pap smear of cervix     Dysplasia of cervix, low grade (ANABELA 1) colpo Bx     FH: melanoma - father     Past Medical History:   Diagnosis Date     Dysplasia of cervix, low grade (ANABELA 1) colpo Bx 10/31/2019     Strep throat     hx scarlet fever        CC  Referred Self, MD  No address on file on close of this encounter.      Again, thank you for allowing me to participate in the care of your patient.        Sincerely,        Cruz Holbrook MD

## 2022-10-12 NOTE — PROGRESS NOTES
[facility] Dermatology Note  Encounter Date: Oct 12, 2022  Office Visit     Dermatology Problem List:  Last skin check 10/12/22, due every 3 months  1. Hx of MIS  -  MIS - right shoulder, s/p excision 5/23/22    Family history: Father had melanoma.   ____________________________________________    Assessment & Plan:    1. History of melanoma in situ, no clinical evidence of recurrence.  - ABCDEs: Counseled ABCDEs of melanoma: Asymmetry, Border (irregularity), Color (not uniform, changes in color), Diameter (greater than 6 mm which is about the size of a pencil eraser), and Evolving (any changes in preexisting moles).  - Sun protection: Counseled SPF30+ sunscreen, UPF clothing, sun avoidance, tanning bed avoidance.   - Recommended yearly dental, ophthalmology, and gynecology exams.  - Recommended skin exams for all first-degree relatives   - Recommended follow up in 3 months     2. Multiple clinically banal-appearing melanocytic nevi: Chronic, stable  - No further intervention required. Patient to report changes.   - Patient reassured of the benign nature of these lesions.    3. Benign findings including: seborrheic keratoses, cherry angioma: minor, self limited problems.  - No further intervention required. Patient to report changes.   - Patient reassured of the benign nature of these lesions.      Procedures Performed:   None.      Follow-up: 3 month(s) in-person for a skin check, or earlier for new or changing lesions    Staff and Scribe:     Scribe Disclosure:   I, Young Gray, am serving as a scribe to document services personally performed by this physician, Dr. Cruz Holbrook, based on data collection and the provider's statements to me.       Provider Disclosure:   The documentation recorded by the scribe accurately reflects the services I personally performed and the decisions made by me.    Cruz Holbrook MD   of Dermatology  Department of Dermatology   [facility]      ____________________________________________    CC: Derm Problem (FBSC, hx of MIS, no area's of concern today)    HPI:  Ms. Laine Melgar is a(n) 27 year old female who presents today as a return patient for a skin check.    Last seen 5/23/22 by Dr. Gilbert for excision of a melanoma in situ on the right shoulder.     Today, she has no areas of concern.    Patient is otherwise feeling well, without additional skin concerns.    Labs Reviewed:  N/A    Physical Exam:  Vitals: LMP 10/06/2022   SKIN: Total skin excluding the undergarment areas was performed. The exam included the head/face, neck, both arms, chest, back, abdomen, both legs, digits and/or nails.   - Well healed scar at site of previous melanoma, no repigmentation or nodularity noted.    - There are dome shaped bright red papules on the trunk and extremities.   - Multiple regular brown pigmented macules and papules are identified on the trunk and extremities.   - Scattered brown macules on sun exposed areas.  - There are waxy stuck on tan to brown papules on the trunk and extremities.    - No other lesions of concern on areas examined.     Medications:  No current outpatient medications on file.     No current facility-administered medications for this visit.      Past Medical History:   Patient Active Problem List   Diagnosis     Other acne     Other kyphoscoliosis and scoliosis     Iron deficiency anemia     HSIL on Pap smear of cervix     Dysplasia of cervix, low grade (ANABELA 1) colpo Bx     FH: melanoma - father     Past Medical History:   Diagnosis Date     Dysplasia of cervix, low grade (ANABELA 1) colpo Bx 10/31/2019     Strep throat     hx scarlet fever        CC Referred Self, MD  No address on file on close of this encounter.

## 2022-10-13 LAB
BKR LAB AP GYN ADEQUACY: NORMAL
BKR LAB AP GYN INTERPRETATION: NORMAL
BKR LAB AP HPV REFLEX: NORMAL
BKR LAB AP PREVIOUS ABNORMAL: NORMAL
PATH REPORT.COMMENTS IMP SPEC: NORMAL
PATH REPORT.COMMENTS IMP SPEC: NORMAL
PATH REPORT.RELEVANT HX SPEC: NORMAL

## 2022-10-20 ENCOUNTER — PATIENT OUTREACH (OUTPATIENT)
Dept: FAMILY MEDICINE | Facility: CLINIC | Age: 27
End: 2022-10-20

## 2022-10-22 PROBLEM — C43.9 MELANOMA OF SKIN (H): Status: ACTIVE | Noted: 2022-10-22

## 2022-10-22 PROBLEM — Z86.006 HX OF MELANOMA IN SITU: Status: ACTIVE | Noted: 2022-10-22

## 2023-06-22 NOTE — PROGRESS NOTES
"Subjective     Laine Melgar is a 25 year old female who presents to clinic today for the following health issues:    HPI   Had her colposcopy in 10/2019 for HSIL.  Now due for a repeat PAP smear.    Chief Complaint   Patient presents with     Repeat Pap Smear       Her 1/2 brother was diagnosed with Meraz's Sarcoma and  5 months later.  She is wondering if there is any risk to her.      Review of Systems   Constitutional, HEENT, cardiovascular, pulmonary, gi and gu systems are negative, except as otherwise noted.      Objective    /60   Pulse 98   Temp 98.1  F (36.7  C) (Temporal)   Resp 18   Ht 1.671 m (5' 5.8\")   Wt 71.2 kg (157 lb)   SpO2 100%   BMI 25.49 kg/m    Body mass index is 25.49 kg/m .  Physical Exam   GENERAL: healthy, alert and no distress   (female): normal female external genitalia, normal urethral meatus , vaginal mucosa pink, moist, well rugated, normal cervix without lesion.  Pap smear obtained without difficulty.      Pap smear obtained.          Assessment & Plan   (N87.0) Dysplasia of cervix, low grade (ANABELA 1) colpo Bx  (primary encounter diagnosis)  Comment: repeat pap today for history of HSIL  Plan: Pap imaged thin layer screen reflex to HPV if         ASCUS - recommend age 25 - 29        Obtained.      (N89.8) Vaginal discharge  Comment: clue cells seen on her wet prep  Plan: Wet prep        Will treat with flagyl.        BMI:   Estimated body mass index is 25.49 kg/m  as calculated from the following:    Height as of this encounter: 1.671 m (5' 5.8\").    Weight as of this encounter: 71.2 kg (157 lb).   Weight management plan: just slightly over 25 so not too concerned about her weight.     Return in about 1 year (around 9/10/2021).    Electronically signed by:  Moises Joshi M.D.  9/10/2020        "
22-Jun-2023

## 2023-07-01 ENCOUNTER — E-VISIT (OUTPATIENT)
Dept: URGENT CARE | Facility: CLINIC | Age: 28
End: 2023-07-01
Payer: COMMERCIAL

## 2023-07-01 DIAGNOSIS — R35.0 FREQUENT URINATION: ICD-10-CM

## 2023-07-01 DIAGNOSIS — N89.8 VAGINAL DISCHARGE: Primary | ICD-10-CM

## 2023-07-01 PROCEDURE — 99207 PR NON-BILLABLE SERV PER CHARTING: CPT | Performed by: PHYSICIAN ASSISTANT

## 2023-07-02 NOTE — PATIENT INSTRUCTIONS
Dear Laine Melgar,    We are sorry you are not feeling well. Based on the responses you provided, it is recommended that you be seen in-person in urgent care so we can better evaluate your symptoms. Please click here to find the nearest urgent care location to you.   You will not be charged for this Visit. Thank you for trusting us with your care.    Jet Vera PA-C

## 2023-07-10 ENCOUNTER — E-VISIT (OUTPATIENT)
Dept: URGENT CARE | Facility: CLINIC | Age: 28
End: 2023-07-10
Payer: COMMERCIAL

## 2023-07-10 DIAGNOSIS — B96.89 BACTERIAL VAGINOSIS: Primary | ICD-10-CM

## 2023-07-10 DIAGNOSIS — N76.0 BACTERIAL VAGINOSIS: Primary | ICD-10-CM

## 2023-07-10 PROCEDURE — 99207 PR NO CHARGE LOS: CPT | Performed by: PHYSICIAN ASSISTANT

## 2023-07-11 RX ORDER — METRONIDAZOLE 500 MG/1
500 TABLET ORAL 2 TIMES DAILY
Qty: 14 TABLET | Refills: 0 | Status: SHIPPED | OUTPATIENT
Start: 2023-07-11 | End: 2023-07-18

## 2023-07-12 NOTE — PATIENT INSTRUCTIONS
Thank you for choosing us for your care. I have placed an order for a prescription so that you can start treatment. View your full visit summary for details by clicking on the link below. Your pharmacist will able to address any questions you may have about the medication.     If you re not feeling better within 2-3 days, please schedule an appointment.  You can schedule an appointment right here in [facility], or call [phone]  If the visit is for the same symptoms as your eVisit, we ll refund the cost of your eVisit if seen within seven days.      Bacterial Vaginosis    You have a vaginal infection called bacterial vaginosis (BV). Both good and bad bacteria are present in a healthy vagina. BV occurs when these bacteria get out of balance. The number of bad bacteria increase. And the number of good bacteria decrease. BV is linked with sexual activity, but it's not a sexually transmitted infection (STI).   BV may or may not cause symptoms. If symptoms do occur, they can include:     Thin, gray, milky-white, or sometimes green discharge    Unpleasant odor or  fishy  smell    Itching, burning, or pain in or around the vagina  It's not known what causes BV, but certain factors can make the problem more likely. These can include:     Douching    Spermicides    Use of antibiotics    Change in hormone levels with pregnancy, breastfeeding, or menopause    Having sex with a new partner    Having sex with more than one partner  BV will sometimes go away on its own. But treatment is often advised. This is because untreated BV can raise the risk of more serious health problems, such as:     Pelvic inflammatory disease (PID)     delivery (giving birth to a baby early if you re pregnant)    HIV and some other sexually transmitted infections (STIs)    Infection after surgery on the reproductive organs  Home care  General care    BV is most often treated with medicines called antibiotics. These may be given as pills or  as a vaginal cream. If antibiotics are prescribed, be sure to use them exactly as directed. And complete all of the medicine, even if your symptoms go away.    Don't douche or have sex during treatment.    If you have sex with a female partner, ask your healthcare provider if she should also be treated.  Prevention    Don't douche.    Don't have sex. If you do have sex, take steps to lower your risk:  ? Use condoms when having sex.  ? Limit the number of sex partners you have.    Follow-up care  Follow up with your healthcare provider, or as advised.   When to get medical advice  Call your healthcare provider right away if any of the following occur:     You have a fever of 100.4 F (38 C) or higher, or as directed by your healthcare provider.    Your symptoms get worse, or they don t go away within a few days of starting treatment.    You have new pain in the lower belly or pelvic region.    You have side effects that bother you or a reaction to the pills or cream you re prescribed.    You or any of your sex partners have new symptoms, such as a rash, joint pain, or sores.  [employer] last reviewed this educational content on 10/1/2022    6989-1419 [employer]. All rights reserved. This information is not intended as a substitute for professional medical care. Always follow your healthcare professional's instructions.

## 2023-11-15 ASSESSMENT — ENCOUNTER SYMPTOMS
FREQUENCY: 0
PALPITATIONS: 0
MYALGIAS: 0
ABDOMINAL PAIN: 0
DIZZINESS: 0
CONSTIPATION: 0
SORE THROAT: 0
FEVER: 0
NERVOUS/ANXIOUS: 0
ARTHRALGIAS: 0
DYSURIA: 0
HEMATOCHEZIA: 0
JOINT SWELLING: 0
COUGH: 0
WEAKNESS: 0
CHILLS: 0
BREAST MASS: 0
HEARTBURN: 0
HEADACHES: 0
DIARRHEA: 0
SHORTNESS OF BREATH: 0
HEMATURIA: 0
NAUSEA: 0
PARESTHESIAS: 0
EYE PAIN: 0

## 2023-11-19 ENCOUNTER — HEALTH MAINTENANCE LETTER (OUTPATIENT)
Age: 28
End: 2023-11-19

## 2023-11-21 ENCOUNTER — OFFICE VISIT (OUTPATIENT)
Dept: FAMILY MEDICINE | Facility: CLINIC | Age: 28
End: 2023-11-21
Payer: COMMERCIAL

## 2023-11-21 VITALS
OXYGEN SATURATION: 99 % | WEIGHT: 176 LBS | BODY MASS INDEX: 27.62 KG/M2 | HEART RATE: 68 BPM | TEMPERATURE: 97.8 F | HEIGHT: 67 IN | SYSTOLIC BLOOD PRESSURE: 112 MMHG | DIASTOLIC BLOOD PRESSURE: 64 MMHG

## 2023-11-21 DIAGNOSIS — N87.0 DYSPLASIA OF CERVIX, LOW GRADE (CIN 1): ICD-10-CM

## 2023-11-21 DIAGNOSIS — F43.21 GRIEF: ICD-10-CM

## 2023-11-21 DIAGNOSIS — Z86.006 HX OF MELANOMA IN SITU: ICD-10-CM

## 2023-11-21 DIAGNOSIS — D03.61 MELANOMA IN SITU OF UPPER ARM, RIGHT (H): ICD-10-CM

## 2023-11-21 DIAGNOSIS — Z00.00 ROUTINE GENERAL MEDICAL EXAMINATION AT A HEALTH CARE FACILITY: Primary | ICD-10-CM

## 2023-11-21 PROBLEM — R87.613 HSIL ON PAP SMEAR OF CERVIX: Status: RESOLVED | Noted: 2019-08-16 | Resolved: 2023-11-21

## 2023-11-21 LAB
C TRACH DNA SPEC QL NAA+PROBE: NEGATIVE
CLUE CELLS: ABNORMAL
N GONORRHOEA DNA SPEC QL NAA+PROBE: NEGATIVE
TRICHOMONAS, WET PREP: ABNORMAL
WBC'S/HIGH POWER FIELD, WET PREP: ABNORMAL
YEAST, WET PREP: ABNORMAL

## 2023-11-21 PROCEDURE — 87624 HPV HI-RISK TYP POOLED RSLT: CPT | Performed by: FAMILY MEDICINE

## 2023-11-21 PROCEDURE — 87491 CHLMYD TRACH DNA AMP PROBE: CPT | Performed by: FAMILY MEDICINE

## 2023-11-21 PROCEDURE — 99213 OFFICE O/P EST LOW 20 MIN: CPT | Mod: 25 | Performed by: FAMILY MEDICINE

## 2023-11-21 PROCEDURE — G0145 SCR C/V CYTO,THINLAYER,RESCR: HCPCS | Performed by: FAMILY MEDICINE

## 2023-11-21 PROCEDURE — 99395 PREV VISIT EST AGE 18-39: CPT | Performed by: FAMILY MEDICINE

## 2023-11-21 PROCEDURE — 87591 N.GONORRHOEAE DNA AMP PROB: CPT | Performed by: FAMILY MEDICINE

## 2023-11-21 PROCEDURE — 87210 SMEAR WET MOUNT SALINE/INK: CPT | Performed by: FAMILY MEDICINE

## 2023-11-21 ASSESSMENT — ENCOUNTER SYMPTOMS
HEMATURIA: 0
COUGH: 0
PALPITATIONS: 0
FREQUENCY: 0
MYALGIAS: 0
SORE THROAT: 0
FEVER: 0
PARESTHESIAS: 0
DYSURIA: 0
JOINT SWELLING: 0
CHILLS: 0
SHORTNESS OF BREATH: 0
ARTHRALGIAS: 0
BREAST MASS: 0
HEMATOCHEZIA: 0
DIZZINESS: 0
EYE PAIN: 0
NERVOUS/ANXIOUS: 0
DIARRHEA: 0
CONSTIPATION: 0
HEADACHES: 0
NAUSEA: 0
WEAKNESS: 0
HEARTBURN: 0
ABDOMINAL PAIN: 0

## 2023-11-21 ASSESSMENT — PAIN SCALES - GENERAL: PAINLEVEL: NO PAIN (0)

## 2023-11-21 NOTE — PROGRESS NOTES
SUBJECTIVE:   Laine is a 28 year old, presenting for the following:  Physical        11/10/2023     7:19 AM   Additional Questions   Roomed by Darleen COMBS       Healthy Habits:     Getting at least 3 servings of Calcium per day:  Yes    Bi-annual eye exam:  NO    Dental care twice a year:  Yes    Sleep apnea or symptoms of sleep apnea:  None    Diet:  Other    Frequency of exercise:  1 day/week    Duration of exercise:  Less than 15 minutes    Taking medications regularly:  Yes    Medication side effects:  Not applicable    Additional concerns today:  No      Laine is here today for physical and general follow-up.  Overall she is doing well, no specific concerns today.  She is grieving for the loss of her mother who  couple weeks ago.  Doing well, no concern of her mental health.  Had melanoma - excised.  Also there is a FH of Melanoma.  Has not seen a dermatologist for over a year - would like a referral to [facility].  Not checking her skin regularly, but no change in skin that she is aware of.     Been sexually active, not on any form of birth control by choice.  Will be happy if she is pregnant.  Never been pregnant before.  Last menstrual period was 11/10/2023 and it was normal.  Her period has been regular, no dysmenorrhea or menorrhagia.  No concern of STD.  Her Pap smear in  show LSIL, colposcopy showed ANAEBLA-1.  Her Pap smear in ,  and  have been normal with negative HPR     No headache, dizziness, or acute visual change. No runny nose, nasal congestion, coughing, fever or chill.  No CP/SOB. No N/V/D/C or problem with urination.  No abdominal pain.  No leg swelling or pain.  Not exercising.   Social alcohol drinker, but no drug or tobacco smoking.  No problem with sleeping - no depression or anxiety.  Feels safe. No other concern today.       Today's PHQ-2 Score:       11/15/2023     1:07 AM   PHQ-2 (  [employer])   Q1: Little interest or pleasure in doing things 0   Q2:  Feeling down, depressed or hopeless 0   PHQ-2 Score 0   Q1: Little interest or pleasure in doing things Not at all   Q2: Feeling down, depressed or hopeless Not at all   PHQ-2 Score 0         Social History     Tobacco Use    Smoking status: Never    Smokeless tobacco: Never    Tobacco comments:     No smokers in home anymore as on .   Substance Use Topics    Alcohol use: No     Comment: Very occasionally 2x a month             11/15/2023     1:07 AM   Alcohol Use   Prescreen: >3 drinks/day or >7 drinks/week? Not Applicable     Reviewed orders with patient.  Reviewed health maintenance and updated orders accordingly - Yes  Patient Active Problem List   Diagnosis    Other kyphoscoliosis and scoliosis    Dysplasia of cervix, low grade (ANABELA 1) colpo Bx    FH: melanoma - father    Hx of melanoma in situ    Melanoma in situ of upper arm, right (H)     Past Surgical History:   Procedure Laterality Date    SKIN CANCER EXCISION      contained melenoma       Social History     Tobacco Use    Smoking status: Never    Smokeless tobacco: Never    Tobacco comments:     No smokers in home anymore as on .   Substance Use Topics    Alcohol use: No     Comment: Very occasionally 2x a month     Family History   Problem Relation Age of Onset    Diabetes Mother     Circulatory Mother     Fibromyalgia Mother     Osteoarthritis Mother     Kidney Disease Father     Cerebrovascular Disease Father     Hypertension Father     C.A.D. Father     Genetic Disorder Father         kidney transplant    Cancer Father         of the red blood cells,  at 57    Unknown/Adopted Sister     Other - See Comments Sister         1/2 sibling on dad's side, 10 years older    Cancer Brother 38        Meraz Sarcoma, first diagnosed as teenager, now has third cancer (1/2 sibling on dad's)    No Known Problems Brother     No Known Problems Brother     No Known Problems Maternal Grandmother     Pulmonary fibrosis Maternal Grandfather     C.A.D. Maternal  Grandfather     Lung Cancer Maternal Grandfather         Abstesosi    Cancer Paternal Grandmother 70         of cancer    C.A.D. Paternal Grandmother     Coronary Artery Disease Paternal Grandfather     Hypertension Paternal Grandfather     C.A.D. Paternal Grandfather     Breast Cancer Maternal Aunt          in 60s    Cervical Cancer Maternal Cousin     Congenital Anomalies Other 23        Great-great Grandmother  at 23, possible Marfan's Syndrome         No current outpatient medications on file.     Allergies   Allergen Reactions    Sulfa Antibiotics Hives       Breast Cancer Screening:    FHS-7:       10/5/2022    11:56 AM 11/3/2023     9:50 AM 11/15/2023     1:08 AM   Breast CA Risk Assessment (FHS-7)   Did any of your first-degree relatives have breast or ovarian cancer? Unknown No    No    No    No No   Did any of your relatives have bilateral breast cancer? Yes Unknown    Unknown    Unknown    Unknown Unknown   Did any man in your family have breast cancer? Yes No    No    No    No No   Did any woman in your family have breast and ovarian cancer? Yes No    No    No    No No   Did any woman in your family have breast cancer before age 50 y? No No    No    No    No No   Do you have 2 or more relatives with breast and/or ovarian cancer? Yes No    No    No    No No   Do you have 2 or more relatives with breast and/or bowel cancer? No No    No    No    No No       Patient under 40 years of age: Routine Mammogram Screening not recommended.   Pertinent mammograms are reviewed under the imaging tab.    History of abnormal Pap smear: YES - updated in Problem List and Health Maintenance accordingly        Latest Ref Rng & Units 10/11/2022     8:51 AM 10/5/2021    10:47 AM 9/10/2020    11:58 AM   PAP / HPV   PAP  Negative for Intraepithelial Lesion or Malignancy (NILM)  Negative for Intraepithelial Lesion or Malignancy (NILM)     PAP (Historical)    NIL    HPV 16 DNA Negative Negative  Negative     HPV 18  "DNA Negative Negative  Negative     Other HR HPV Negative Negative  Negative       Reviewed and updated as needed this visit by clinical staff   Tobacco  Allergies  Meds  Problems  Med Hx  Surg Hx  Fam Hx  Soc   Hx        Reviewed and updated as needed this visit by Provider   Tobacco  Allergies  Meds  Problems  Med Hx  Surg Hx  Fam Hx  Soc   Hx           Review of Systems   Constitutional:  Negative for chills and fever.   HENT:  Negative for congestion, ear pain, hearing loss and sore throat.    Eyes:  Negative for pain and visual disturbance.   Respiratory:  Negative for cough and shortness of breath.    Cardiovascular:  Negative for chest pain, palpitations and peripheral edema.   Gastrointestinal:  Negative for abdominal pain, constipation, diarrhea, heartburn, hematochezia and nausea.   Breasts:  Negative for tenderness, breast mass and discharge.   Genitourinary:  Positive for vaginal discharge. Negative for dysuria, frequency, genital sores, hematuria, pelvic pain, urgency and vaginal bleeding.   Musculoskeletal:  Negative for arthralgias, joint swelling and myalgias.   Skin:  Negative for rash.   Neurological:  Negative for dizziness, weakness, headaches and paresthesias.   Psychiatric/Behavioral:  Negative for mood changes. The patient is not nervous/anxious.      Please see subjective otherwise the complete review of system was negative      OBJECTIVE:   /64 (Cuff Size: Adult Regular)   Pulse 68   Temp 97.8  F (36.6  C) (Temporal)   Ht 1.69 m (5' 6.54\")   Wt 79.8 kg (176 lb)   LMP 11/10/2023 (Exact Date)   SpO2 99%   BMI 27.95 kg/m    Physical Exam  GENERAL: healthy, alert and no distress.  Speaking in full sentences.  EYES: Eyes grossly normal to inspection, PERRL and conjunctivae and sclerae normal.  No nystagmus.  All 4 visual fields intact.  HENT: ear canals and TM's normal.  Nares are non-congested. Oropharynx is pink and moist. No tender with palpation to the sinuses. "   NECK: no adenopathy, supple, no lymphadenopathy or thyromegaly.  No tender with palpation to the cervical spine or its paraspinous muscle.  RESP: lungs clear to auscultation - no rales, rhonchi or wheezes.  Good respiratory effort throughout.  BREAST: Offered but she declined.  She has no concern about it.  CV: regular rate and rhythm, no murmur  ABDOMEN: soft, nontender, nondistended, no palpable masses organomegaly with normal culture.  No CVA tenderness.   (female): Normal female external genitalia, normal urethral meatus, vaginal mucosa, normal cervix/adnexa/uterus without masses or discharge. Pending for pap and HPV.  MS: no gross musculoskeletal defects noted, no edema. All joints are in the normal range of motion and 4 extremities were equally in strength. Hips, knees, ankles, shoulders, elbows, wrists exams were normal. Fine motor skills of the fingers are intact. Back is straight, no tender with palpation to the spine.  SKIN: no suspicious lesions or rashes.  Very light skin with freckles throughout her body.  NEURO: Normal strength and tone, mentation intact and speech normal.  Cranial nerve II through XII intact.  DTRs +2 throughout.  No focal neurological deficit.  PSYCH: mentation appears normal, affect normal/bright.  Thoughts intact, suicidal/homicidal ideation.  No hallucination.      Diagnostic Test Results:  Labs reviewed in Epic  Results for orders placed or performed in visit on 11/21/23   Wet prep - Clinic Collect     Status: Abnormal    Specimen: Vagina; Swab   Result Value Ref Range    Trichomonas Absent Absent    Yeast Absent Absent    Clue Cells Absent Absent    WBCs/high power field 1+ (A) None       ASSESSMENT/PLAN:   (Z00.00) Routine general medical examination at a health care facility  (primary encounter diagnosis)  (F43.21) Grief  Comment: Generally Laine is healthy and is doing well.  She is grieving appropriately.  My condolences to the loss of her mother.  Discussed with her  the normal process of further grieving and symptoms the need to be seen to call in.  Discussed about safety issue, healthy diet, exercising, weight management, good sleeping hygiene, contraceptions option, advanced care planning, safe sex and STD prevention, substance/alcohol misuse prevention, and depression prevention.  UTD for immunization, recommended and offered COVID vaccination but she declined.  Pending for pap and HPV.  UTD for labs.  As per her request, checked for GC and wet prep today.  All of her questions were answered.    She was also recommended to start a prenatal vitamin daily since she is sexually reactive with no reliable form of birth control.     Plan: Wet prep - Clinic Collect, NEISSERIA GONORRHOEA        PCR, CHLAMYDIA TRACHOMATIS PCR            (N87.0) Dysplasia of cervix, low grade (ANABELA 1) colpo Bx  Comment:   Her Pap smear in 2019 show LSIL, colposcopy showed ANABELA-1.  Her Pap smear in 2020, 2021 and 2023 were normal.  Repeat the Pap smear with HPV today, if it is normal with negative HR HPV then repeat in 3 years. *8 urinary can use a stool    (Z86.006) Hx of melanoma in situ  (D03.61) Melanoma in situ of upper arm, right (H)  Comment: Skin exam today was normal.  Due to personal and family history of melanoma, she is referred to dermatology for close monitoring.  She was recommended to have an extensive skin exam by the dermatologist at least annually.   Also instructed follow-up if there are any change in size, shape shape or color on any lesion.  Emphasized the importance of using sunblock.       Patient has been advised of split billing requirements and indicates understanding: Yes      COUNSELING:  Reviewed preventive health counseling, as reflected in patient instructions       Regular exercise       Healthy diet/nutrition       Alcohol Use       Contraception       Family planning       Folic Acid        Safe sex practices/STD prevention       Advance Care Planning         BMI:  "  Estimated body mass index is 27.95 kg/m  as calculated from the following:    Height as of this encounter: 1.69 m (5' 6.54\").    Weight as of this encounter: 79.8 kg (176 lb).   Weight management plan: Discussed healthy diet and exercise guidelines      She reports that she has never smoked. She has never used smokeless tobacco.          Geno Weston Mai, MD  [facility]  "

## 2023-11-27 LAB
BKR LAB AP GYN ADEQUACY: NORMAL
BKR LAB AP GYN INTERPRETATION: NORMAL
BKR LAB AP HPV REFLEX: NORMAL
BKR LAB AP PREVIOUS ABNL DX: NORMAL
BKR LAB AP PREVIOUS ABNORMAL: NORMAL
PATH REPORT.COMMENTS IMP SPEC: NORMAL
PATH REPORT.COMMENTS IMP SPEC: NORMAL
PATH REPORT.RELEVANT HX SPEC: NORMAL

## 2023-11-28 ENCOUNTER — TELEPHONE (OUTPATIENT)
Dept: DERMATOLOGY | Facility: CLINIC | Age: 28
End: 2023-11-28
Payer: COMMERCIAL

## 2023-11-28 NOTE — TELEPHONE ENCOUNTER
Pt called and confirmed that she does not need surgery, she has not had anything biopsied. She has a history of MIS and needed a FBSC scheduled. Pt scheduled for 1/17/24 with Sam[facility].    Tammy Ruby RN on 11/28/2023 at 2:04 PM

## 2023-11-28 NOTE — TELEPHONE ENCOUNTER
[facility] Call Center    Phone Message    May a detailed message be left on voicemail: yes     Reason for Call: Pt confirmed biopsy positive referral  please call pt to discuss, thank you.     Action Taken: Message routed to:  [facility] ([facility]    Travel Screening: Not Applicable

## 2024-01-17 ENCOUNTER — OFFICE VISIT (OUTPATIENT)
Dept: DERMATOLOGY | Facility: CLINIC | Age: 29
End: 2024-01-17
Payer: COMMERCIAL

## 2024-01-17 DIAGNOSIS — L82.1 SEBORRHEIC KERATOSES: ICD-10-CM

## 2024-01-17 DIAGNOSIS — D18.01 CHERRY ANGIOMA: ICD-10-CM

## 2024-01-17 DIAGNOSIS — Z85.820 HISTORY OF MELANOMA: Primary | ICD-10-CM

## 2024-01-17 DIAGNOSIS — D22.9 MULTIPLE BENIGN NEVI: ICD-10-CM

## 2024-01-17 PROCEDURE — 99213 OFFICE O/P EST LOW 20 MIN: CPT | Performed by: PHYSICIAN ASSISTANT

## 2024-01-17 NOTE — NURSING NOTE
Laine Melgar's goals for this visit include:   Chief Complaint   Patient presents with    Skin Check     FBSE.  Areas of concern on back.   HX of MIS.       She requests these members of her care team be copied on today's visit information:     PCP: Moises Joshi    Referring Provider:  No referring provider defined for this encounter.    There were no vitals taken for this visit.    Do you need any medication refills at today's visit?     Viktoriya Bishop on 1/17/2024 at 4:00 PM

## 2024-01-17 NOTE — PROGRESS NOTES
[facility] Dermatology Note  Encounter Date: Jan 17, 2024  Office Visit      Dermatology Problem List:  Last FBSC performed on 1/17/24    1. Hx of MIS  -  MIS - right shoulder, s/p excision 5/23/22     Family Hx: Father had melanoma.   ____________________________________________    Assessment & Plan:  # Hx of MIS, R shoulder   - ABCDEs: Counseled ABCDEs of melanoma: Asymmetry, Border (irregularity), Color (not uniform, changes in color), Diameter (greater than 6 mm which is about the size of a pencil eraser), and Evolving (any changes in preexisting moles).  - Sun protection: Counseled SPF30+ sunscreen, UPF clothing, sun avoidance, tanning bed avoidance.   - Recommended yearly dental, ophthalmology, and gynecology exams.  - Recommended skin exams for all first-degree relatives.  - Recommended follow up is 6 months    # Benign findings: multiple benign nevi, SKs  - edu on benign etiology  - Signs and Symptoms of non-melanoma skin cancer and ABCDEs of melanoma reviewed with patient. Patient encouraged to perform monthly self skin exams and educated on how to perform them. UV precautions reviewed with patient. Patient was asked about new or changing moles/lesions on body.   - Sunscreen: Apply 20 minutes prior to going outdoors and reapply every two hours, when wet or sweating. We recommend using an SPF 30 or higher, and to use one that is water resistant.     - RTC for changes       Procedures Performed:   None     Follow-up: 1 year(s) in-person, or earlier for new or changing lesions    Staff and scribe     Scribe Disclosure:   I, NANI BEAN, am serving as a scribe; to document services personally performed by Nan Mera PA-C -based on data collection and the provider's statements to me.     Provider Disclosure:  I agree with above History, Review of Systems, Physical exam and Plan.  I have reviewed the content of the documentation and have edited it as needed. I have personally  performed the services documented here and the documentation accurately represents those services and the decisions I have made.      Electronically signed by:    All risks, benefits and alternatives were discussed with patient.  Patient is in agreement and understands the assessment and plan.  All questions were answered.    [facility]: Phone: [phone], Fax: [phone]  [facility]: Phone: [phone],  Fax: [phone]  [facility]: Phone: [phone], Fax: [phone]  ____________________________________________    CC: Skin Check (FBSE.  Areas of concern on back.   HX of MIS. )      Reviewed patients past medical history and pertinent chart review prior to patient's visit today.     HPI:  Ms. Laine Melgar is a 28 year old female who presents today as a return patient for FBSE.     Today patient reported spots of concern on her back.     Has Hx of MIS.    Patient is otherwise feeling well, without additional concerns.    Labs:  N.A    Physical Exam:  Vitals: There were no vitals taken for this visit.  SKIN: Total skin excluding the undergarment areas was performed. The exam included the head/face, neck, both arms, chest, back, abdomen, both legs, digits and/or nails.    - Well healed scar at site of previous melanoma, no repigmentation or nodularity noted.    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   - There are waxy stuck on tan to brown papules on the trunk.   -  LYMPH: Examination of the pre/post auricular, occipital, cervical, clavicular, axillary and inguinal lymph nodes was negative.  - No other lesions of concern on areas examined.     Medications:  No current outpatient medications on file.     No current facility-administered medications for this visit.      Past Medical/Surgical  History:   Patient Active Problem List   Diagnosis    Other kyphoscoliosis and scoliosis    Dysplasia of cervix, low grade (ANABELA 1) colpo Bx    FH: melanoma - father    Hx of melanoma in situ    Melanoma in situ of upper arm, right (H)     Past Medical History:   Diagnosis Date    Dysplasia of cervix, low grade (ANABELA 1) colpo Bx 10/31/2019    HSIL on Pap smear of cervix     8/13/19 HSIL & LSIL pap @ 25 yo. Plan: [facility] with [facility].   9/11/19 [facility] bx: ANABELA 1. Plan: Cotest in 1 yr.  9/10/20 NIL pap, no HPV ordered. Will plan cotest in 1 year  10/5/21 NIL Pap, Neg HR HPV. Plan: cotest in 1 yr.   10/11/22 NIL Pap, Neg HR HPV. Plan: cotest in 1 yr.   11/10/23 Appt    Iron deficiency anemia     Strep throat     hx scarlet fever

## 2024-01-17 NOTE — PATIENT INSTRUCTIONS
Patient Education       Proper skin care from [facility]:    -Eliminate harsh soaps as they strip the natural oils from the skin, often resulting in dry itchy skin ( i.e. Dial, Zest, Nepali Spring)  -Use mild soaps such as Cetaphil or Dove Sensitive Skin in the shower. You do not need to use soap on arms, legs, and trunk every time you shower unless visibly soiled.   -Avoid hot or cold showers.  -After showering, lightly dry off and apply moisturizing within 2-3 minutes. This will help trap moisture in the skin.   -Aggressive use of a moisturizer at least 1-2 times a day to the entire body (including -Vanicream, Cetaphil, Aquaphor or Cerave) and moisturize hands after every washing.  -We recommend using moisturizers that come in a tub that needs to be scooped out, not a pump. This has more of an oil base. It will hold moisture in your skin much better than a water base moisturizer. The above recommended are non-pore clogging.      Wear a sunscreen with at least SPF 30 on your face, ears, neck and V of the chest daily. Wear sunscreen on other areas of the body if those areas are exposed to the sun throughout the day. Sunscreens can contain physical and/or chemical blockers. Physical blockers are less likely to clog pores, these include zinc oxide and titanium dioxide. Reapply every two hour and after swimming.     Sunscreen examples: https://www.ewg.org/sunscreen/    UV radiation  UVA radiation remains constant throughout the day and throughout the year. It is a longer wavelength than UVB and therefore penetrates deeper into the skin leading to immediate and delayed tanning, photoaging, and skin cancer. 70-80% of UVA and UVB radiation occurs between the hours of 10am-2pm.  UVB radiation  UVB radiation causes the most harmful effects and is more significant during the summer months. However, snow and ice can reflect UVB radiation leading to skin damage during the winter months as well. UVB radiation is  responsible for tanning, burning, inflammation, delayed erythema (pinkness), pigmentation (brown spots), and skin cancer.     I recommend self monthly full body exams and yearly full body exams with a dermatology provider. If you develop a new or changing lesion please follow up for examination. Most skin cancers are pink and scaly or pink and pearly. However, we do see blue/brown/black skin cancers.  Consider the ABCDEs of melanoma when giving yourself your monthly full body exam ( don't forget the groin, buttocks, feet, toes, etc). A-asymmetry, B-borders, C-color, D-diameter, E-elevation or evolving. If you see any of these changes please follow up in clinic. If you cannot see your back I recommend purchasing a hand held mirror to use with a larger wall mirror.       Checking for Skin Cancer  You can find cancer early by checking your skin each month. There are 3 kinds of skin cancer. They are melanoma, basal cell carcinoma, and squamous cell carcinoma. Doing monthly skin checks is the best way to find new marks or skin changes. Follow the instructions below for checking your skin.   The ABCDEs of checking moles for melanoma   Check your moles or growths for signs of melanoma using ABCDE:   Asymmetry: the sides of the mole or growth don t match  Border: the edges are ragged, notched, or blurred  Color: the color within the mole or growth varies  Diameter: the mole or growth is larger than 6 mm (size of a pencil eraser)  Evolving: the size, shape, or color of the mole or growth is changing (evolving is not shown in the images below)    Checking for other types of skin cancer  Basal cell carcinoma or squamous cell carcinoma have symptoms such as:     A spot or mole that looks different from all other marks on your skin  Changes in how an area feels, such as itching, tenderness, or pain  Changes in the skin's surface, such as oozing, bleeding, or scaliness  A sore that does not heal  New swelling or redness beyond  the border of a mole    Who s at risk?  Anyone can get skin cancer. But you are at greater risk if you have:   Fair skin, light-colored hair, or light-colored eyes  Many moles or abnormal moles on your skin  A history of sunburns from sunlight or tanning beds  A family history of skin cancer  A history of exposure to radiation or chemicals  A weakened immune system  If you have had skin cancer in the past, you are at risk for recurring skin cancer.   How to check your skin  Do your monthly skin checkups in front of a full-length mirror. Check all parts of your body, including your:   Head (ears, face, neck, and scalp)  Torso (front, back, and sides)  Arms (tops, undersides, upper, and lower armpits)  Hands (palms, backs, and fingers, including under the nails)  Buttocks and genitals  Legs (front, back, and sides)  Feet (tops, soles, toes, including under the nails, and between toes)  If you have a lot of moles, take digital photos of them each month. Make sure to take photos both up close and from a distance. These can help you see if any moles change over time.   Most skin changes are not cancer. But if you see any changes in your skin, call your doctor right away. Only he or she can diagnose a problem. If you have skin cancer, seeing your doctor can be the first step toward getting the treatment that could save your life.   [employer] last reviewed this educational content on 4/1/2019 2000-2020 [employer]. [address] [place] [address], [place] [address]. All rights reserved. This information is not intended as a substitute for professional medical care. Always follow your healthcare professional's instructions.

## 2024-01-17 NOTE — LETTER
1/17/2024         RE: Laine Melgar  [address]  [facility] [address]        Dear Colleague,    Thank you for referring your patient, Laine Melgar, to the [facility]. Please see a copy of my visit note below.    [facility] Dermatology Note  Encounter Date: Jan 17, 2024  Office Visit      Dermatology Problem List:  Last FBSC performed on 1/17/24    1. Hx of MIS  -  MIS - right shoulder, s/p excision 5/23/22     Family Hx: Father had melanoma.   ____________________________________________    Assessment & Plan:  # Hx of MIS, R shoulder   - ABCDEs: Counseled ABCDEs of melanoma: Asymmetry, Border (irregularity), Color (not uniform, changes in color), Diameter (greater than 6 mm which is about the size of a pencil eraser), and Evolving (any changes in preexisting moles).  - Sun protection: Counseled SPF30+ sunscreen, UPF clothing, sun avoidance, tanning bed avoidance.   - Recommended yearly dental, ophthalmology, and gynecology exams.  - Recommended skin exams for all first-degree relatives.  - Recommended follow up is 6 months    # Benign findings: multiple benign nevi, SKs  - edu on benign etiology  - Signs and Symptoms of non-melanoma skin cancer and ABCDEs of melanoma reviewed with patient. Patient encouraged to perform monthly self skin exams and educated on how to perform them. UV precautions reviewed with patient. Patient was asked about new or changing moles/lesions on body.   - Sunscreen: Apply 20 minutes prior to going outdoors and reapply every two hours, when wet or sweating. We recommend using an SPF 30 or higher, and to use one that is water resistant.     - RTC for changes       Procedures Performed:   None     Follow-up: 1 year(s) in-person, or earlier for new or changing lesions    Staff and scribe     Scribe Disclosure:   NANI JARRELL, am serving as a scribe; to document services personally performed by Nan Mera PA-C -based on data  collection and the provider's statements to me.     Provider Disclosure:  I agree with above History, Review of Systems, Physical exam and Plan.  I have reviewed the content of the documentation and have edited it as needed. I have personally performed the services documented here and the documentation accurately represents those services and the decisions I have made.      Electronically signed by:    All risks, benefits and alternatives were discussed with patient.  Patient is in agreement and understands the assessment and plan.  All questions were answered.    [facility]: Phone: [phone], Fax: [phone]  [facility]: Phone: [phone],  Fax: [phone]  [facility]: Phone: [phone], Fax: [phone]  ____________________________________________    CC: Skin Check (FBSE.  Areas of concern on back.   HX of MIS. )      Reviewed patients past medical history and pertinent chart review prior to patient's visit today.     HPI:  Ms. Laine Melgar is a 28 year old female who presents today as a return patient for FBSE.     Today patient reported spots of concern on her back.     Has Hx of MIS.    Patient is otherwise feeling well, without additional concerns.    Labs:  N.A    Physical Exam:  Vitals: There were no vitals taken for this visit.  SKIN: Total skin excluding the undergarment areas was performed. The exam included the head/face, neck, both arms, chest, back, abdomen, both legs, digits and/or nails.    - Well healed scar at site of previous melanoma, no repigmentation or nodularity noted.    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   - There are waxy stuck on tan to brown papules on the trunk.   -  LYMPH: Examination of the pre/post auricular, occipital, cervical, clavicular, axillary  and inguinal lymph nodes was negative.  - No other lesions of concern on areas examined.     Medications:  No current outpatient medications on file.     No current facility-administered medications for this visit.      Past Medical/Surgical History:   Patient Active Problem List   Diagnosis     Other kyphoscoliosis and scoliosis     Dysplasia of cervix, low grade (ANABELA 1) colpo Bx     FH: melanoma - father     Hx of melanoma in situ     Melanoma in situ of upper arm, right (H)     Past Medical History:   Diagnosis Date     Dysplasia of cervix, low grade (ANABELA 1) colpo Bx 10/31/2019     HSIL on Pap smear of cervix     8/13/19 HSIL & LSIL pap @ 23 yo. Plan: [place] with ECC.   9/11/19 [place] bx: ANABELA 1. Plan: Cotest in 1 yr.  9/10/20 NIL pap, no HPV ordered. Will plan cotest in 1 year  10/5/21 NIL Pap, Neg HR HPV. Plan: cotest in 1 yr.   10/11/22 NIL Pap, Neg HR HPV. Plan: cotest in 1 yr.   11/10/23 Appt     Iron deficiency anemia      Strep throat     hx scarlet fever                        Again, thank you for allowing me to participate in the care of your patient.        Sincerely,        Nan Mera PA-C

## 2024-04-25 ENCOUNTER — TELEPHONE (OUTPATIENT)
Dept: FAMILY MEDICINE | Facility: CLINIC | Age: 29
End: 2024-04-25
Payer: COMMERCIAL

## 2024-09-09 NOTE — TELEPHONE ENCOUNTER
Appt on 10/5/21   [de-identified] : 9/9/2024: Sinus rhythm at 68 bpm with poor R wave progression and possible left atrial enlargement.

## 2024-10-31 SDOH — HEALTH STABILITY: PHYSICAL HEALTH: ON AVERAGE, HOW MANY MINUTES DO YOU ENGAGE IN EXERCISE AT THIS LEVEL?: 40 MIN

## 2024-10-31 SDOH — HEALTH STABILITY: PHYSICAL HEALTH: ON AVERAGE, HOW MANY DAYS PER WEEK DO YOU ENGAGE IN MODERATE TO STRENUOUS EXERCISE (LIKE A BRISK WALK)?: 5 DAYS

## 2024-10-31 ASSESSMENT — SOCIAL DETERMINANTS OF HEALTH (SDOH): HOW OFTEN DO YOU GET TOGETHER WITH FRIENDS OR RELATIVES?: ONCE A WEEK

## 2024-11-05 ENCOUNTER — OFFICE VISIT (OUTPATIENT)
Dept: FAMILY MEDICINE | Facility: CLINIC | Age: 29
End: 2024-11-05
Payer: COMMERCIAL

## 2024-11-05 VITALS
DIASTOLIC BLOOD PRESSURE: 68 MMHG | TEMPERATURE: 97.3 F | BODY MASS INDEX: 27.32 KG/M2 | RESPIRATION RATE: 16 BRPM | HEIGHT: 66 IN | OXYGEN SATURATION: 100 % | HEART RATE: 70 BPM | SYSTOLIC BLOOD PRESSURE: 110 MMHG | WEIGHT: 170 LBS

## 2024-11-05 DIAGNOSIS — Z00.00 ROUTINE GENERAL MEDICAL EXAMINATION AT A HEALTH CARE FACILITY: Primary | ICD-10-CM

## 2024-11-05 DIAGNOSIS — Z11.3 SCREEN FOR STD (SEXUALLY TRANSMITTED DISEASE): ICD-10-CM

## 2024-11-05 DIAGNOSIS — N87.0 DYSPLASIA OF CERVIX, LOW GRADE (CIN 1): ICD-10-CM

## 2024-11-05 DIAGNOSIS — Z86.006 HX OF MELANOMA IN SITU: ICD-10-CM

## 2024-11-05 DIAGNOSIS — R30.0 DYSURIA: ICD-10-CM

## 2024-11-05 DIAGNOSIS — D03.61 MELANOMA IN SITU OF UPPER ARM, RIGHT (H): ICD-10-CM

## 2024-11-05 DIAGNOSIS — Z80.8 FH: MELANOMA: ICD-10-CM

## 2024-11-05 DIAGNOSIS — D03.61 MELANOMA IN SITU OF RIGHT SHOULDER (H): ICD-10-CM

## 2024-11-05 LAB
ALBUMIN UR-MCNC: NEGATIVE MG/DL
ANION GAP SERPL CALCULATED.3IONS-SCNC: 12 MMOL/L (ref 7–15)
APPEARANCE UR: ABNORMAL
BILIRUB UR QL STRIP: NEGATIVE
BUN SERPL-MCNC: 16.9 MG/DL (ref 6–20)
C TRACH DNA SPEC QL NAA+PROBE: NEGATIVE
CALCIUM SERPL-MCNC: 9.3 MG/DL (ref 8.8–10.4)
CHLORIDE SERPL-SCNC: 104 MMOL/L (ref 98–107)
CHOLEST SERPL-MCNC: 133 MG/DL
COLOR UR AUTO: YELLOW
CREAT SERPL-MCNC: 0.95 MG/DL (ref 0.51–0.95)
EGFRCR SERPLBLD CKD-EPI 2021: 82 ML/MIN/1.73M2
ERYTHROCYTE [DISTWIDTH] IN BLOOD BY AUTOMATED COUNT: 12.6 % (ref 10–15)
FASTING STATUS PATIENT QL REPORTED: YES
FASTING STATUS PATIENT QL REPORTED: YES
GLUCOSE SERPL-MCNC: 86 MG/DL (ref 70–99)
GLUCOSE UR STRIP-MCNC: NEGATIVE MG/DL
HCO3 SERPL-SCNC: 23 MMOL/L (ref 22–29)
HCT VFR BLD AUTO: 40.1 % (ref 35–47)
HDLC SERPL-MCNC: 53 MG/DL
HGB BLD-MCNC: 13.4 G/DL (ref 11.7–15.7)
HGB UR QL STRIP: NEGATIVE
HPV HR 12 DNA CVX QL NAA+PROBE: NEGATIVE
HPV16 DNA CVX QL NAA+PROBE: NEGATIVE
HPV18 DNA CVX QL NAA+PROBE: NEGATIVE
HUMAN PAPILLOMA VIRUS FINAL DIAGNOSIS: NORMAL
KETONES UR STRIP-MCNC: 5 MG/DL
LDLC SERPL CALC-MCNC: 65 MG/DL
LEUKOCYTE ESTERASE UR QL STRIP: ABNORMAL
MCH RBC QN AUTO: 31.1 PG (ref 26.5–33)
MCHC RBC AUTO-ENTMCNC: 33.4 G/DL (ref 31.5–36.5)
MCV RBC AUTO: 93 FL (ref 78–100)
N GONORRHOEA DNA SPEC QL NAA+PROBE: NEGATIVE
NITRATE UR QL: NEGATIVE
NONHDLC SERPL-MCNC: 80 MG/DL
PH UR STRIP: 5 [PH] (ref 5–7)
PLATELET # BLD AUTO: 257 10E3/UL (ref 150–450)
POTASSIUM SERPL-SCNC: 3.6 MMOL/L (ref 3.4–5.3)
RBC # BLD AUTO: 4.31 10E6/UL (ref 3.8–5.2)
RBC URINE: 3 /HPF
SODIUM SERPL-SCNC: 139 MMOL/L (ref 135–145)
SP GR UR STRIP: 1.01 (ref 1–1.03)
SQUAMOUS EPITHELIAL: 1 /HPF
TRIGL SERPL-MCNC: 76 MG/DL
UROBILINOGEN UR STRIP-MCNC: NORMAL MG/DL
WBC # BLD AUTO: 11.3 10E3/UL (ref 4–11)
WBC CLUMPS #/AREA URNS HPF: PRESENT /HPF
WBC URINE: >182 /HPF

## 2024-11-05 PROCEDURE — 87491 CHLMYD TRACH DNA AMP PROBE: CPT | Performed by: FAMILY MEDICINE

## 2024-11-05 PROCEDURE — 87086 URINE CULTURE/COLONY COUNT: CPT | Performed by: FAMILY MEDICINE

## 2024-11-05 PROCEDURE — 87624 HPV HI-RISK TYP POOLED RSLT: CPT | Performed by: FAMILY MEDICINE

## 2024-11-05 PROCEDURE — 80061 LIPID PANEL: CPT | Performed by: FAMILY MEDICINE

## 2024-11-05 PROCEDURE — 81001 URINALYSIS AUTO W/SCOPE: CPT | Performed by: FAMILY MEDICINE

## 2024-11-05 PROCEDURE — 85027 COMPLETE CBC AUTOMATED: CPT | Performed by: FAMILY MEDICINE

## 2024-11-05 PROCEDURE — 80048 BASIC METABOLIC PNL TOTAL CA: CPT | Performed by: FAMILY MEDICINE

## 2024-11-05 PROCEDURE — 36415 COLL VENOUS BLD VENIPUNCTURE: CPT | Performed by: FAMILY MEDICINE

## 2024-11-05 PROCEDURE — 87591 N.GONORRHOEAE DNA AMP PROB: CPT | Performed by: FAMILY MEDICINE

## 2024-11-05 PROCEDURE — G0145 SCR C/V CYTO,THINLAYER,RESCR: HCPCS | Performed by: FAMILY MEDICINE

## 2024-11-05 PROCEDURE — 87088 URINE BACTERIA CULTURE: CPT | Performed by: FAMILY MEDICINE

## 2024-11-05 ASSESSMENT — PAIN SCALES - GENERAL: PAINLEVEL_OUTOF10: NO PAIN (0)

## 2024-11-05 NOTE — PATIENT INSTRUCTIONS
Patient Education   Preventive Care Advice   This is general advice given by our system to help you stay healthy. However, your care team may have specific advice just for you. Please talk to your care team about your preventive care needs.  Nutrition  Eat 5 or more servings of fruits and vegetables each day.  Try wheat bread, brown rice and whole grain pasta (instead of white bread, rice, and pasta).  Get enough calcium and vitamin D. Check the label on foods and aim for 100% of the RDA (recommended daily allowance).  Lifestyle  Exercise at least 150 minutes each week  (30 minutes a day, 5 days a week).  Do muscle strengthening activities 2 days a week. These help control your weight and prevent disease.  No smoking.  Wear sunscreen to prevent skin cancer.  Have a dental exam and cleaning every 6 months.  Yearly exams  See your health care team every year to talk about:  Any changes in your health.  Any medicines your care team has prescribed.  Preventive care, family planning, and ways to prevent chronic diseases.  Shots (vaccines)   HPV shots (up to age 26), if you've never had them before.  Hepatitis B shots (up to age 59), if you've never had them before.  COVID-19 shot: Get this shot when it's due.  Flu shot: Get a flu shot every year.  Tetanus shot: Get a tetanus shot every 10 years.  Pneumococcal, hepatitis A, and RSV shots: Ask your care team if you need these based on your risk.  Shingles shot (for age 50 and up)  General health tests  Diabetes screening:  Starting at age 35, Get screened for diabetes at least every 3 years.  If you are younger than age 35, ask your care team if you should be screened for diabetes.  Cholesterol test: At age 39, start having a cholesterol test every 5 years, or more often if advised.  Bone density scan (DEXA): At age 50, ask your care team if you should have this scan for osteoporosis (brittle bones).  Hepatitis C: Get tested at least once in your life.  STIs (sexually  transmitted infections)  Before age 24: Ask your care team if you should be screened for STIs.  After age 24: Get screened for STIs if you're at risk. You are at risk for STIs (including HIV) if:  You are sexually active with more than one person.  You don't use condoms every time.  You or a partner was diagnosed with a sexually transmitted infection.  If you are at risk for HIV, ask about PrEP medicine to prevent HIV.  Get tested for HIV at least once in your life, whether you are at risk for HIV or not.  Cancer screening tests  Cervical cancer screening: If you have a cervix, begin getting regular cervical cancer screening tests starting at age 21.  Breast cancer scan (mammogram): If you've ever had breasts, begin having regular mammograms starting at age 40. This is a scan to check for breast cancer.  Colon cancer screening: It is important to start screening for colon cancer at age 45.  Have a colonoscopy test every 10 years (or more often if you're at risk) Or, ask your provider about stool tests like a FIT test every year or Cologuard test every 3 years.  To learn more about your testing options, visit:   .  For help making a decision, visit:   https://bit.ly/cu05763.  Prostate cancer screening test: If you have a prostate, ask your care team if a prostate cancer screening test (PSA) at age 55 is right for you.  Lung cancer screening: If you are a current or former smoker ages 50 to 80, ask your care team if ongoing lung cancer screenings are right for you.  For informational purposes only. Not to replace the advice of your health care provider. Copyright   2023 [facility]. All rights reserved. Clinically reviewed by the [facility] Transitions Program. [employer] [id number] - REV 01/24.  Safer Sex: Care Instructions  Overview  Safer sex is a way to reduce your risk of getting a sexually transmitted infection (STI). It can also help prevent pregnancy.  Several products can help you practice  safer sex and reduce your chance of STIs. One of the best is a condom. There are internal and external condoms. You can use a special rubber sheet (dental dam) for protection during oral sex. Disposable gloves can keep your hands from touching blood, semen, or other body fluids that can carry infections.  Remember that birth control methods such as diaphragms, IUDs, foams, and birth control pills do not stop you from getting STIs.  Follow-up care is a key part of your treatment and safety. Be sure to make and go to all appointments, and call your doctor if you are having problems. It's also a good idea to know your test results and keep a list of the medicines you take.  How can you care for yourself at home?  Think about getting vaccinated to help prevent hepatitis A, hepatitis B, and human papillomavirus (HPV). They can be spread through sex.  Use a condom every time you have sex. Use an external condom, which goes on the penis. Or use an internal condom, which goes into the vagina or anus.  Make sure you use the right size external condom. A condom that's too small can break easily. A condom that's too big can slip off during sex.  Use a new condom each time you have sex. Be careful not to poke a hole in the condom when you open the wrapper.  Don't use an internal condom and an external condom at the same time.  Never use petroleum jelly (such as Vaseline), grease, hand lotion, baby oil, or anything with oil in it. These products can make holes in the condom.  After intercourse, hold the edge of the condom as you remove it. This will help keep semen from spilling out of the condom.  Do not have sex with anyone who has symptoms of an STI, such as sores on the genitals or mouth.  Do not drink a lot of alcohol or use drugs before sex.  Limit your sex partners. Sex with one partner who has sex only with you can reduce your risk of getting an STI.  Don't share sex toys. But if you do share them, use a condom and clean  "the sex toys between each use.  Talk to any partners before you have sex. Talk about what you feel comfortable with and whether you have any boundaries with sex. And find out if your partner or partners may be at risk for any STI. Keep in mind that a person may be able to spread an STI even if they do not have symptoms. You and any partners may want to get tested for STIs.  Where can you learn more?  Go to https://www.[employer].net/patiented  Enter B608 in the search box to learn more about \"Safer Sex: Care Instructions.\"  Current as of: November 27, 2023  Content Version: 14.2 2024 [employer]   Care instructions adapted under license by your healthcare professional. If you have questions about a medical condition or this instruction, always ask your healthcare professional. Healthwise, Incorporated disclaims any warranty or liability for your use of this information.    Eating Healthy Foods: Care Instructions  With every meal, you can make healthy food choices. Try to eat a variety of fruits, vegetables, whole grains, lean proteins, and low-fat dairy products. This can help you get the right balance of nutrients, including vitamins and minerals. Small changes add up over time. You can start by adding one healthy food to your meals each day.    Try to make half your plate fruits and vegetables, one-fourth whole grains, and one-fourth lean proteins. Try including dairy with your meals.   Eat more fruits and vegetables. Try to have them with most meals and snacks.   Foods for healthy eating        Fruits   These can be fresh, frozen, canned, or dried.  Try to choose whole fruit rather than fruit juice.  Eat a variety of colors.        Vegetables   These can be fresh, frozen, canned, or dried.  Beans, peas, and lentils count too.        Whole grains   Choose whole-grain breads, cereals, and noodles.  Try brown rice.        Lean proteins   These can include lean meat, poultry, fish, and eggs.  You can " "also have tofu, beans, peas, lentils, nuts, and seeds.        Dairy   Try milk, yogurt, and cheese.  Choose low-fat or fat-free when you can.  If you need to, use lactose-free milk or fortified plant-based milk products, such as soy milk.        Water   Drink water when you're thirsty.  Limit sugar-sweetened drinks, including soda, fruit drinks, and sports drinks.  Where can you learn more?  Go to https://www.[employer].net/patiented  Enter T756 in the search box to learn more about \"Eating Healthy Foods: Care Instructions.\"  Current as of: September 20, 2023  Content Version: 14.2 2024 [employer].   Care instructions adapted under license by your healthcare professional. If you have questions about a medical condition or this instruction, always ask your healthcare professional. Healthwise, Incorporated disclaims any warranty or liability for your use of this information.    Body Mass Index: Care Instructions  Overview     Body mass index (BMI) can help you see if your weight is raising your risk for health problems. It uses a formula to compare how much you weigh with how tall you are.  A BMI lower than 18.5 is considered underweight.  A BMI between 18.5 and 24.9 is considered healthy.  A BMI between 25 and 29.9 is considered overweight. A BMI of 30 or higher is considered obese.  If your BMI is in the normal range, it means that you have a lower risk for weight-related health problems. If your BMI is in the overweight or obese range, you may be at increased risk for weight-related health problems, such as high blood pressure, heart disease, stroke, arthritis or joint pain, and diabetes. If your BMI is in the underweight range, you may be at increased risk for health problems such as fatigue, lower protection (immunity) against illness, muscle loss, bone loss, hair loss, and hormone problems.  BMI is just one measure of your risk for weight-related health problems. You may be at higher risk for " health problems if you are not active, you eat an unhealthy diet, or you drink too much alcohol or use tobacco products.  Follow-up care is a key part of your treatment and safety. Be sure to make and go to all appointments, and call your doctor if you are having problems. It's also a good idea to know your test results and keep a list of the medicines you take.  How can you care for yourself at home?  Practice healthy eating habits. This includes eating plenty of fruits, vegetables, whole grains, lean protein, and low-fat dairy.  If your doctor recommends it, get more exercise. Walking is a good choice. Bit by bit, increase the amount you walk every day. Try for at least 30 minutes on most days of the week.  Do not smoke. Smoking can increase your risk for health problems. If you need help quitting, talk to your doctor about stop-smoking programs and medicines. These can increase your chances of quitting for good.  Limit alcohol to 2 drinks a day for men and 1 drink a day for women. Too much alcohol can cause health problems.  If you have a BMI higher than 25  Your doctor may do other tests to check your risk for weight-related health problems. This may include measuring the distance around your waist. A waist measurement of more than 40 inches in men or 35 inches in women can increase the risk of weight-related health problems.  Talk with your doctor about steps you can take to stay healthy or improve your health. You may need to make lifestyle changes to lose weight and stay healthy, such as changing your diet and getting regular exercise.  If you have a BMI lower than 18.5  Your doctor may do other tests to check your risk for health problems.  Talk with your doctor about steps you can take to stay healthy or improve your health. You may need to make lifestyle changes to gain or maintain weight and stay healthy, such as getting more healthy foods in your diet and doing exercises to build muscle.  Where can you  "learn more?  Go to https://www.healthLolly Wolly [employer]oodle.net/patiented  Enter S176 in the search box to learn more about \"Body Mass Index: Care Instructions.\"  Current as of: May 13, 2023  Content Version: 14.2 2024 [email or web address].   Care instructions adapted under license by your healthcare professional. If you have questions about a medical condition or this instruction, always ask your healthcare professional. Healthwise, Incorporated disclaims any warranty or liability for your use of this information.    Learning About Being Physically Active  What is physical activity?     Being physically active means doing any kind of activity that gets your body moving.  The types of physical activity that can help you get fit and stay healthy include:  Aerobic or \"cardio\" activities. These make your heart beat faster and make you breathe harder, such as brisk walking, riding a bike, or running. They strengthen your heart and lungs and build up your endurance.  Strength training activities. These make your muscles work against, or \"resist,\" something. Examples include lifting weights or doing push-ups. These activities help tone and strengthen your muscles and bones.  Stretches. These let you move your joints and muscles through their full range of motion. Stretching helps you be more flexible.  Reaching a balance between these three types of physical activity is important because each one contributes to your overall fitness.  What are the benefits of being active?  Being active is one of the best things you can do for your health. It helps you to:  Feel stronger and have more energy to do all the things you like to do.  Focus better at school or work.  Feel, think, and sleep better.  Reach and stay at a healthy weight.  Lose fat and build lean muscle.  Lower your risk for serious health problems, including diabetes, heart attack, high blood pressure, and some cancers.  Keep your heart, lungs, bones, muscles, and joints strong " "and healthy.  How can you make being active part of your life?  Start slowly. Make it your long-term goal to get at least 30 minutes of exercise on most days of the week. Walking is a good choice. You also may want to do other activities, such as running, swimming, cycling, or playing tennis or team sports.  Pick activities that you like--ones that make your heart beat faster, your muscles stronger, and your muscles and joints more flexible. If you find more than one thing you like doing, do them all. You don't have to do the same thing every day.  Get your heart pumping every day. Any activity that makes your heart beat faster and keeps it at that rate for a while counts.  Here are some great ways to get your heart beating faster:  Go for a brisk walk, run, or hike.  Go for a swim or bike ride.  Take an online exercise class or dance.  Play a game of touch football, basketball, or soccer.  Play tennis, pickleball, or racquetball.  Climb stairs.  Even some household chores can be aerobic. Just do them at a faster pace. Raking or mowing the lawn, sweeping the garage, and vacuuming and cleaning your home all can help get your heart rate up.  Strengthen your muscles during the week. You don't have to lift heavy weights or grow big, bulky muscles to get stronger. Doing a few simple activities that make your muscles work against, or \"resist,\" something can help you get stronger. Aim for at least twice a week.  For example, you can:  Do push-ups or sit-ups, which use your own body weight as resistance.  Lift weights or dumbbells or use stretch bands at home or in a gym or community center.  Stretch your muscles often. Stretching will help you as you become more active. It can help you stay flexible and loosen tight muscles. It can also help improve your balance and posture and can be a great way to relax.  Be sure to stretch the muscles you'll be using when you work out. It's best to warm your muscles slightly before you " "stretch them. Walk or do some other light aerobic activity for a few minutes. Then start stretching.  When you stretch your muscles:  Do it slowly. Stretching is not about going fast or making sudden movements.  Don't push or bounce during a stretch.  Hold each stretch for at least 15 to 30 seconds, if you can. You should feel a stretch in the muscle, but not pain.  Breathe out as you do the stretch. Then breathe in as you hold the stretch. Don't hold your breath.  If you're worried about how more activity might affect your health, have a checkup before you start. Follow any special advice your doctor gives you for getting a smart start.  Where can you learn more?  Go to https://www.[employer].net/patiented  Enter W332 in the search box to learn more about \"Learning About Being Physically Active.\"  Current as of: June 5, 2023  Content Version: 14.2 2024 [employer].   Care instructions adapted under license by your healthcare professional. If you have questions about a medical condition or this instruction, always ask your healthcare professional. Healthwise, Incorporated disclaims any warranty or liability for your use of this information.       "

## 2024-11-05 NOTE — PROGRESS NOTES
Preventive Care Visit  [facility]  Geno Weston Mai, MD, Family Medicine  Nov 5, 2024  {Provider  Link to [facility] :[id number]}    Assessment & Plan     (Z00.00) Routine general medical examination at a health care facility  (primary encounter diagnosis)  (Z11.3) Screen for STD (sexually transmitted disease)  Comment: ***  Plan: Lipid panel reflex to direct LDL Non-fasting,         Basic metabolic panel  (Ca, Cl, CO2, Creat,         Gluc, K, Na, BUN), CBC with platelets        NEISSERIA GONORRHOEA PCR, CHLAMYDIA TRACHOMATIS        PCR        ***    (N87.0) Dysplasia of cervix, low grade (ANABELA 1) colpo Bx  Comment: Her Pap smear in 2019 show LSIL, colposcopy showed ANABELA-1.  Her Pap smear in 2020, 2021 and 2023 were normal.  Discussed this with her about the current recommendation, she did not need another Pap smear until 2026, 3 years from the last Pap smear.  Patient however referred Pap smears annually and therefore Pap smear with HPV today obtained today.    Plan: HPV and Gynecologic Cytology Panel -         Recommended Age 30-65 Years            (Z86.006) Hx of melanoma in situ  (D03.61) Melanoma in situ of upper arm, right (H)  (D03.61) Melanoma in situ of right shoulder (H)  (Z80.8) FH: melanoma - father  Comment: Skin exam today was normal.  Due to personal and family history of melanoma, she was strongly encouraged to work closely with a dermatologist for close monitoring.  She was strongly encouraged to have an extensive skin exam by the dermatologist at least annually.  She was also encouraged to monitor her skin closely, follow-up immediately if noted any change in size, shape shape or color on any lesion.  Emphasized the importance of using sunblock.  Last appointment with a dermatologist was in January 2024, she plan to follow-up with him in January or February of 2025.      (R30.0) Dysuria  Comment: ***  Plan: UA Macroscopic with reflex to Microscopic and         Culture - Lab  "Collect        ***    {Patient advised of split billing (Optional):906118}        BMI  Estimated body mass index is 27.44 kg/m  as calculated from the following:    Height as of this encounter: 1.676 m (5' 6\").    Weight as of this encounter: 77.1 kg (170 lb).   {Weight Management Plan needed for ACO:885245}    Counseling  Appropriate preventive services were addressed with this patient via screening, questionnaire, or discussion as appropriate for fall prevention, nutrition, physical activity, Tobacco-use cessation, social engagement, weight loss and cognition.  Checklist reviewing preventive services available has been given to the patient.  Reviewed patient's diet, addressing concerns and/or questions.       {FOLLOW UP PLANS (Optional) Includes COVID19 Treatment Plan:134345}    Jeniffer Jang is a 29 year old, presenting for the following:  Physical and Urinary Problem (Patient states having pain with urination-took an at home test and came back positive)        11/5/2024     7:27 AM   Additional Questions   Roomed by Claudia          MARSHA Jang is here today for physical and general follow-up.  Overall she is doing well, no specific concerns today except of dysuria with urine frequency and urgency in the last week.  It is getting worse.  No obvious hematuria.  No abnormal abnormal vaginal discharge.  No fever or chill.  No nausea vomiting.     Had melanoma on her right upper arm and shoulder- excised.  Father also had melanoma.  Saw her dermatologist about 9 months ago and is planning to have a dermatology exam annually.  Been checking her skin regularly, no change in skin that she is aware of.     Been sexually active with a new partner of 5 months - interested to be screened for chlamydia and gonorrhea.  Condom has been effective.  Last menstrual period was  couple weeks and it was normal.  Her period has been regular, no dysmenorrhea or menorrhagia.   Her Pap smear in 2019 show LSIL, colposcopy showed " ANABELA-1.  Her Pap smear in 2020, 2021 and 2022 have been normal with negative HPR.  She preferred Pap smear annually and requested to get one today     No headache, dizziness, or acute visual change. No runny nose, nasal congestion, coughing, fever or chill.  No CP/SOB. No N/V/D/C.  No abdominal pain.  No leg swelling, orthopnea or dyspnea.  No joint or muscle pain.  Exercising with cardio and lifting with about 4-5 times a week.   Social alcohol drinker, but no drug or tobacco smoking.  No problem with sleeping - no depression or anxiety.  No safety concerns. No other concern today.       Health Care Directive  Patient does not have a Health Care Directive: Discussed advance care planning with patient; however, patient declined at this time.      10/31/2024   General Health   How would you rate your overall physical health? Good   Feel stress (tense, anxious, or unable to sleep) Not at all            10/31/2024   Nutrition   Three or more servings of calcium each day? Yes   Diet: Carbohydrate counting   How many servings of fruit and vegetables per day? (!) 2-3   How many sweetened beverages each day? 0-1            10/31/2024   Exercise   Days per week of moderate/strenous exercise 5 days   Average minutes spent exercising at this level 40 min            10/31/2024   Social Factors   Frequency of gathering with friends or relatives Once a week   Worry food won't last until get money to buy more No   Food not last or not have enough money for food? No   Do you have housing? (Housing is defined as stable permanent housing and does not include staying ouside in a car, in a tent, in an abandoned building, in an overnight shelter, or couch-surfing.) Yes   Are you worried about losing your housing? No   Lack of transportation? No   Unable to get utilities (heat,electricity)? No            10/31/2024   Dental   Dentist two times every year? Yes            10/31/2024   TB Screening   Were you born outside of the [place]? No           {Rooming Staff Patient needs a PHQ as part of the AWV.  Use this link to complete and then refresh the note to pull results Link to PHQ2 Assessment :084476}    Today's PHQ-2 Score:       1/17/2024     4:00 PM   PHQ-2 ( 1999 Pf[employer])   Q1: Little interest or pleasure in doing things 0   Q2: Feeling down, depressed or hopeless 0   PHQ-2 Score 0         10/31/2024   Substance Use   Alcohol more than 3/day or more than 7/wk No   Do you use any other substances recreationally? No        Social History     Tobacco Use    Smoking status: Never    Smokeless tobacco: Never    Tobacco comments:     No smokers in home anymore as on 6-09.   Vaping Use    Vaping status: Never Used   Substance Use Topics    Alcohol use: No     Comment: Very occasionally 2x a month    Drug use: No     {Provider  If there are gaps in the social history shown above, please follow the link to update and then refresh the note Link to Social and Substance History :705592}      11/15/2023   LAST FHS-7 RESULTS   1st degree relative breast or ovarian cancer No    Any relative bilateral breast cancer Unknown    Any male have breast cancer No    Any ONE woman have BOTH breast AND ovarian cancer No    Any woman with breast cancer before 50yrs No    2 or more relatives with breast AND/OR ovarian cancer No    2 or more relatives with breast AND/OR bowel cancer No        Patient-reported     {If any of the questions to the FHS7 are answered yes, consider referral for genetic counseling.    Additional indications for genetic referral include personal history of breast or ovarian cancer, genetic mutation in 1st degree relative which increases risk of breast cancer including BRCA1, BRCA2, ROSALINE, PALB 2, TP53, CHEK2, PTEN, CDH1, STK11 (per ACS) and/or 1st degree relative with history of pancreatic or high-risk prostate cancer (per NCCN):929320}     Mammogram Screening - Patient under 40 years of age: Routine Mammogram Screening not recommended.          10/31/2024   STI Screening   New sexual partner(s) since last STI/HIV test? (!) YES - boyfriend of 5 months        History of abnormal Pap smear: YES - reflected in Problem List and Health Maintenance accordingly        Latest Ref Rng & Units 11/21/2023    11:39 AM 10/11/2022     8:51 AM 10/5/2021    10:47 AM   PAP / HPV   PAP  Negative for Intraepithelial Lesion or Malignancy (NILM)  Negative for Intraepithelial Lesion or Malignancy (NILM)  Negative for Intraepithelial Lesion or Malignancy (NILM)    HPV 16 DNA Negative Negative  Negative  Negative    HPV 18 DNA Negative Negative  Negative  Negative    Other HR HPV Negative Negative  Negative  Negative            10/31/2024   Contraception/Family Planning   Questions about contraception or family planning No        {Provider  REQUIRED FOR AWV Use the storyboard to review patient history, after sections have been marked as reviewed, refresh note to capture documentation:330789}   Reviewed and updated as needed this visit by Provider   Tobacco  Allergies  Meds  Problems  Med Hx  Surg Hx  Fam Hx  Soc   Hx Sexual Activity          Past Medical History:   Diagnosis Date    Dysplasia of cervix, low grade (ANABELA 1) colpo Bx 10/31/2019    HSIL on Pap smear of cervix     8/13/19 HSIL & LSIL pap @ 23 yo. Plan: Villanueva with ECC.   9/11/19 Villanueva bx: ANABELA 1. Plan: Cotest in 1 yr.  9/10/20 NIL pap, no HPV ordered. Will plan cotest in 1 year  10/5/21 NIL Pap, Neg HR HPV. Plan: cotest in 1 yr.   10/11/22 NIL Pap, Neg HR HPV. Plan: cotest in 1 yr.   11/10/23 Appt    Iron deficiency anemia      Past Surgical History:   Procedure Laterality Date    SKIN CANCER EXCISION      contained melenoma     Patient Active Problem List   Diagnosis    Other kyphoscoliosis and scoliosis    Dysplasia of cervix, low grade (ANABELA 1) colpo Bx    FH: melanoma - father    Hx of melanoma in situ - righ upper arm and right shoulder    Melanoma in situ of right shoulder (H)     Past Surgical History:  "  Procedure Laterality Date    SKIN CANCER EXCISION      contained melenoma       Social History     Tobacco Use    Smoking status: Never    Smokeless tobacco: Never    Tobacco comments:     No smokers in home anymore as on .   Substance Use Topics    Alcohol use: No     Comment: Very occasionally 2x a month     Family History   Problem Relation Age of Onset    Diabetes Mother     Circulatory Mother     Fibromyalgia Mother     Osteoarthritis Mother     Kidney Disease Father     Cerebrovascular Disease Father     Hypertension Father     C.A.D. Father     Genetic Disorder Father         kidney transplant    Cancer Father         of the red blood cells,  at 57    Unknown/Adopted Sister     Other - See Comments Sister         1/2 sibling on dad's side, 10 years older    Cancer Brother 38        Meraz Sarcoma, first diagnosed as teenager, now has third cancer (1/2 sibling on dad's)    No Known Problems Brother     No Known Problems Brother     No Known Problems Maternal Grandmother     Pulmonary fibrosis Maternal Grandfather     C.A.D. Maternal Grandfather     Lung Cancer Maternal Grandfather         Abstesosi    Cancer Paternal Grandmother 70         of cancer    C.A.D. Paternal Grandmother     Coronary Artery Disease Paternal Grandfather     Hypertension Paternal Grandfather     C.A.D. Paternal Grandfather     Breast Cancer Maternal Aunt          in 60s    Cervical Cancer Maternal Cousin     Congenital Anomalies Other 23        Great-great Grandmother  at 23, possible Marfan's Syndrome         No current outpatient medications on file.     Allergies   Allergen Reactions    Sulfa Antibiotics Hives         Review of Systems  Constitutional, HEENT, cardiovascular, pulmonary, GI, , musculoskeletal, neuro, skin, endocrine and psych systems are negative, except as otherwise noted.     Objective    Exam  /68   Pulse 70   Temp 97.3  F (36.3  C) (Temporal)   Resp 16   Ht 1.676 m (5' 6\")   Wt " "77.1 kg (170 lb)   LMP 10/15/2024   SpO2 100%   BMI 27.44 kg/m     Estimated body mass index is 27.44 kg/m  as calculated from the following:    Height as of this encounter: 1.676 m (5' 6\").    Weight as of this encounter: 77.1 kg (170 lb).    Physical Exam  GENERAL: healthy, alert and no distress.  Speaking in full sentences.  EYES: Eyes grossly normal to inspection, PERRL and conjunctivae and sclerae normal.  No nystagmus.  All 4 visual fields intact.  HENT: ear canals and TM's normal.  Nares are non-congested. Oropharynx is pink and moist. No tender with palpation to the sinuses.   NECK: no adenopathy, supple, no lymphadenopathy or thyromegaly.  No tender with palpation to the cervical spine or its paraspinous muscle.  RESP: lungs clear to auscultation - no rales, rhonchi or wheezes.  Good respiratory effort throughout.  BREAST: Offered but she declined.  She has no concern about it.  CV: regular rate and rhythm, no murmur  ABDOMEN: soft, nontender, nondistended, no palpable masses organomegaly with normal bowel sound.  No CVA tenderness.   (female): Normal female external genitalia, normal urethral meatus, vaginal mucosa, normal cervix/adnexa/uterus without masses or discharge. Pending for pap and HPV.  MS: no gross musculoskeletal defects noted, no edema. All joints are in the normal range of motion and 4 extremities were equally in strength. Hips, knees, ankles, shoulders, elbows, wrists exams were normal. Fine motor skills of the fingers are intact. Back is straight, no tender with palpation to the spine.  SKIN: Complete skin exam today was normal, no suspicious lesions or rashes.  Very light skin with freckles.  NEURO: Normal strength and tone, mentation intact and speech normal.  Cranial nerve II through XII intact.  DTRs +2 throughout.  No focal neurological deficit.  PSYCH: mentation appears normal, affect normal/bright.  Thoughts intact, no hallucination.      Results for orders placed or performed " in visit on 11/05/24   CBC with platelets     Status: Abnormal   Result Value Ref Range    WBC Count 11.3 (H) 4.0 - 11.0 10e3/uL    RBC Count 4.31 3.80 - 5.20 10e6/uL    Hemoglobin 13.4 11.7 - 15.7 g/dL    Hematocrit 40.1 35.0 - 47.0 %    MCV 93 78 - 100 fL    MCH 31.1 26.5 - 33.0 pg    MCHC 33.4 31.5 - 36.5 g/dL    RDW 12.6 10.0 - 15.0 %    Platelet Count 257 150 - 450 10e3/uL            Signed Electronically by: Geno Weston Mai, MD     Calculated 65 <100 mg/dL    Non HDL Cholesterol 80 <130 mg/dL    Patient Fasting > 8hrs? Yes     Narrative    Cholesterol  Desirable: < 200 mg/dL  Borderline High: 200 - 239 mg/dL  High: >= 240 mg/dL    Triglycerides  Normal: < 150 mg/dL  Borderline High: 150 - 199 mg/dL  High: 200-499 mg/dL  Very High: >= 500 mg/dL    Direct Measure HDL  Female: >= 50 mg/dL   Male: >= 40 mg/dL    LDL Cholesterol  Desirable: < 100 mg/dL  Above Desirable: 100 - 129 mg/dL   Borderline High: 130 - 159 mg/dL   High:  160 - 189 mg/dL   Very High: >= 190 mg/dL    Non HDL Cholesterol  Desirable: < 130 mg/dL  Above Desirable: 130 - 159 mg/dL  Borderline High: 160 - 189 mg/dL  High: 190 - 219 mg/dL  Very High: >= 220 mg/dL   Basic metabolic panel  (Ca, Cl, CO2, Creat, Gluc, K, Na, BUN)     Status: None   Result Value Ref Range    Sodium 139 135 - 145 mmol/L    Potassium 3.6 3.4 - 5.3 mmol/L    Chloride 104 98 - 107 mmol/L    Carbon Dioxide (CO2) 23 22 - 29 mmol/L    Anion Gap 12 7 - 15 mmol/L    Urea Nitrogen 16.9 6.0 - 20.0 mg/dL    Creatinine 0.95 0.51 - 0.95 mg/dL    GFR Estimate 82 >60 mL/min/1.73m2    Calcium 9.3 8.8 - 10.4 mg/dL    Glucose 86 70 - 99 mg/dL    Patient Fasting > 8hrs? Yes    CBC with platelets     Status: Abnormal   Result Value Ref Range    WBC Count 11.3 (H) 4.0 - 11.0 10e3/uL    RBC Count 4.31 3.80 - 5.20 10e6/uL    Hemoglobin 13.4 11.7 - 15.7 g/dL    Hematocrit 40.1 35.0 - 47.0 %    MCV 93 78 - 100 fL    MCH 31.1 26.5 - 33.0 pg    MCHC 33.4 31.5 - 36.5 g/dL    RDW 12.6 10.0 - 15.0 %    Platelet Count 257 150 - 450 10e3/uL   UA Macroscopic with reflex to Microscopic and Culture - Lab Collect     Status: Abnormal    Specimen: Urine, NOS   Result Value Ref Range    Color Urine Yellow Colorless, Straw, Light Yellow, Yellow    Appearance Urine Cloudy (A) Clear    Glucose Urine Negative Negative mg/dL    Bilirubin Urine Negative Negative    Ketones Urine 5 (A) Negative mg/dL    Specific Gravity Urine 1.014 1.003 - 1.035     Blood Urine Negative Negative    pH Urine 5.0 5.0 - 7.0    Protein Albumin Urine Negative Negative mg/dL    Urobilinogen Urine Normal Normal, 2.0 mg/dL    Nitrite Urine Negative Negative    Leukocyte Esterase Urine Large (A) Negative    WBC Clumps Urine Present (A) None Seen /HPF    RBC Urine 3 (H) <=2 /HPF    WBC Urine >182 (H) <=5 /HPF    Squamous Epithelials Urine 1 <=1 /HPF    Narrative    Urine Culture ordered based on laboratory criteria   Gynecologic Cytology (PAP)     Status: None   Result Value Ref Range    Interpretation        Negative for Intraepithelial Lesion or Malignancy (NILM)    Comment         Papanicolaou Test Limitations:  Cervical cytology is a screening test with limited sensitivity, and regular screening is critical for cancer prevention.  Pap tests are primarily effective for the diagnosis/prevention of squamous cell carcinoma, not adenocarcinoma or other cancers.        Specimen Adequacy       Satisfactory for evaluation, endocervical/transformation zone component present    Clinical Information       none      LMP/Menopause Date       10/15/2024      Previous Abnormal?       No      Performing Labs       The technical component of this testing was completed at [facility].    Stain controls for all stains resulted within this report have been reviewed and show appropriate reactivity.      Associated HPV Report       Please see the associated HPV High Risk Types DNA Cervical report for Specimen [id number] from the same collection date.     NEISSERIA GONORRHOEA PCR     Status: Normal    Specimen: Cervix; Swab   Result Value Ref Range    Neisseria gonorrhoeae Negative Negative   CHLAMYDIA TRACHOMATIS PCR     Status: Normal    Specimen: Cervix; Swab   Result Value Ref Range    Chlamydia trachomatis Negative Negative   Urine Culture     Status: Abnormal    Specimen: Urine, NOS   Result Value Ref Range    Culture 50,000-100,000  CFU/mL Staphylococcus saprophyticus (A)             Signed Electronically by: Geno Weston Mai, MD

## 2024-11-06 ENCOUNTER — MYC MEDICAL ADVICE (OUTPATIENT)
Dept: FAMILY MEDICINE | Facility: CLINIC | Age: 29
End: 2024-11-06
Payer: COMMERCIAL

## 2024-11-06 DIAGNOSIS — N39.0 URINARY TRACT INFECTION WITHOUT HEMATURIA, SITE UNSPECIFIED: Primary | ICD-10-CM

## 2024-11-06 LAB — BACTERIA UR CULT: ABNORMAL

## 2024-11-06 RX ORDER — NITROFURANTOIN 25; 75 MG/1; MG/1
100 CAPSULE ORAL 2 TIMES DAILY
Qty: 14 CAPSULE | Refills: 0 | Status: SHIPPED | OUTPATIENT
Start: 2024-11-06 | End: 2024-11-13

## 2024-11-06 NOTE — TELEPHONE ENCOUNTER
Patient was just seen yesterday and labs were done put Per Dr rahman wants to wait until Cutlure comes back.

## 2024-11-08 PROBLEM — R87.613 HSIL ON PAP SMEAR OF CERVIX: Status: RESOLVED | Noted: 2019-08-16 | Resolved: 2023-11-21

## 2024-11-08 LAB
BKR AP ASSOCIATED HPV REPORT: NORMAL
BKR LAB AP GYN ADEQUACY: NORMAL
BKR LAB AP GYN INTERPRETATION: NORMAL
BKR LAB AP LMP: NORMAL
BKR LAB AP PREVIOUS ABNORMAL: NORMAL
PATH REPORT.COMMENTS IMP SPEC: NORMAL
PATH REPORT.COMMENTS IMP SPEC: NORMAL
PATH REPORT.RELEVANT HX SPEC: NORMAL

## 2024-12-30 ENCOUNTER — MYC MEDICAL ADVICE (OUTPATIENT)
Dept: FAMILY MEDICINE | Facility: CLINIC | Age: 29
End: 2024-12-30
Payer: COMMERCIAL

## 2024-12-31 ENCOUNTER — E-VISIT (OUTPATIENT)
Dept: URGENT CARE | Facility: CLINIC | Age: 29
End: 2024-12-31
Payer: COMMERCIAL

## 2024-12-31 DIAGNOSIS — N39.0 ACUTE UTI (URINARY TRACT INFECTION): Primary | ICD-10-CM

## 2024-12-31 RX ORDER — NITROFURANTOIN 25; 75 MG/1; MG/1
100 CAPSULE ORAL 2 TIMES DAILY
Qty: 10 CAPSULE | Refills: 0 | Status: SHIPPED | OUTPATIENT
Start: 2024-12-31 | End: 2025-01-05

## 2025-01-01 NOTE — PATIENT INSTRUCTIONS
Dear Laine Melgar    After reviewing your responses, I've been able to diagnose you with a urinary tract infection, which is a common infection of the bladder with bacteria.  This is not a sexually transmitted infection, though urinating immediately after intercourse can help prevent infections.  Drinking lots of fluids is also helpful to clear your current infection and prevent the next one.      I have sent a prescription for antibiotics to your pharmacy to treat this infection.    It is important that you take all of your prescribed medication even if your symptoms are improving after a few doses.  Taking all of your medicine helps prevent the symptoms from returning.     If your symptoms worsen, you develop pain in your back or stomach, develop fevers, or are not improving in 5 days, please contact your primary care provider for an appointment or visit any of our convenient Walk-in or Urgent Care Centers to be seen, which can be found on our website here.    Thanks again for choosing us as your health care partner,    Celena Tellez CNP  Urinary Tract Infection (UTI) in Women: Care Instructions  Overview     A urinary tract infection (UTI) is an infection caused by bacteria. It can happen anywhere in the urinary tract. A UTI can happen in the:  Kidneys.  Ureters, the tubes that connect the kidneys to the bladder.  Bladder.  Urethra, where the urine comes out.  Most UTIs are bladder infections. They often cause pain or burning when you urinate.  Most UTIs can be cured with antibiotics. If you are prescribed antibiotics, be sure to complete your treatment so that the infection does not get worse.  Follow-up care is a key part of your treatment and safety. Be sure to make and go to all appointments, and call your doctor if you are having problems. It's also a good idea to know your test results and keep a list of the medicines you take.  How can you care for yourself at home?  Take your antibiotics as  "directed. Do not stop taking them just because you feel better. You need to take the full course of antibiotics.  Drink extra water and other fluids for the next day or two. This will help make the urine less concentrated and help wash out the bacteria that are causing the infection. (If you have kidney, heart, or liver disease and have to limit fluids, talk with your doctor before you increase the amount of fluids you drink.)  Avoid drinks that are carbonated or have caffeine. They can irritate the bladder.  Urinate often. Try to empty your bladder each time.  To relieve pain, take a hot bath or lay a heating pad set on low over your lower belly or genital area. Never go to sleep with a heating pad in place.  To prevent UTIs  Drink plenty of water each day. This helps you urinate often, which clears bacteria from your system. (If you have kidney, heart, or liver disease and have to limit fluids, talk with your doctor before you increase the amount of fluids you drink.)  Urinate when you need to.  If you are sexually active, urinate right after you have sex.  Change sanitary pads often.  Avoid douches, bubble baths, feminine hygiene sprays, and other feminine hygiene products that have deodorants.  After going to the bathroom, wipe from front to back.  When should you call for help?   Call your doctor now or seek immediate medical care if:    You have new or worse fever, chills, nausea, or vomiting.     You have new pain in your back just below your rib cage. This is called flank pain.     There is new blood or pus in your urine.     You have any problems with your antibiotic medicine.   Watch closely for changes in your health, and be sure to contact your doctor if:    You are not getting better after taking an antibiotic for 2 days.     Your symptoms go away but then come back.   Where can you learn more?  Go to https://www.healthwise.net/patiented  Enter K848 in the search box to learn more about \"Urinary Tract " "Infection (UTI) in Women: Care Instructions.\"  Current as of: April 30, 2024  Content Version: 14.3    2024 [employer].   Care instructions adapted under license by your healthcare professional. If you have questions about a medical condition or this instruction, always ask your healthcare professional. [employer] disclaims any warranty or liability for your use of this information.    "

## 2025-05-14 ENCOUNTER — OFFICE VISIT (OUTPATIENT)
Dept: DERMATOLOGY | Facility: CLINIC | Age: 30
End: 2025-05-14
Attending: PHYSICIAN ASSISTANT
Payer: COMMERCIAL

## 2025-05-14 DIAGNOSIS — Z85.820 HISTORY OF MELANOMA: ICD-10-CM

## 2025-05-14 DIAGNOSIS — L82.1 SEBORRHEIC KERATOSES: ICD-10-CM

## 2025-05-14 DIAGNOSIS — D18.01 CHERRY ANGIOMA: ICD-10-CM

## 2025-05-14 DIAGNOSIS — D22.9 MULTIPLE BENIGN NEVI: Primary | ICD-10-CM

## 2025-05-14 DIAGNOSIS — Z12.83 SKIN CANCER SCREENING: ICD-10-CM

## 2025-05-14 NOTE — LETTER
5/14/2025      Laine Melgar  [address]  [facility] [address]      Dear Colleague,    Thank you for referring your patient, Laine Melgar, to the [facility]. Please see a copy of my visit note below.    [facility] Dermatology Note  Encounter Date: May 14, 2025  Office Visit      Dermatology Problem List:  Last FBSC performed on 5/14/25    1. Hx of MIS  -  MIS - right shoulder, s/p excision 5/23/22     Family Hx: Father had melanoma.   ____________________________________________    Assessment & Plan:  # Hx of MIS, R shoulder   - ABCDEs: Counseled ABCDEs of melanoma: Asymmetry, Border (irregularity), Color (not uniform, changes in color), Diameter (greater than 6 mm which is about the size of a pencil eraser), and Evolving (any changes in preexisting moles).  - Sun protection: Counseled SPF30+ sunscreen, UPF clothing, sun avoidance, tanning bed avoidance.   - Recommended yearly dental, ophthalmology, and gynecology exams.  - Recommended skin exams for all first-degree relatives.  - Recommended follow up is 6-12 months    # Benign findings: multiple benign nevi, SKs, cherry angiomas  - edu on benign etiology  - Signs and Symptoms of non-melanoma skin cancer and ABCDEs of melanoma reviewed with patient. Patient encouraged to perform monthly self skin exams and educated on how to perform them. UV precautions reviewed with patient. Patient was asked about new or changing moles/lesions on body.   - Sunscreen: Apply 20 minutes prior to going outdoors and reapply every two hours, when wet or sweating. We recommend using an SPF 30 or higher, and to use one that is water resistant.     - RTC for changes     Procedures Performed:   None     Follow-up: 1 year(s) in-person, or earlier for new or changing lesions    Staff and scribe     Scribe Disclosure:   NANI JARRELL, am serving as a scribe; to document services personally performed by Nan Mera PA-C -based on  data collection and the provider's statements to me.     Provider Disclosure:  I agree with above History, Review of Systems, Physical exam and Plan.  I have reviewed the content of the documentation and have edited it as needed. I have personally performed the services documented here and the documentation accurately represents those services and the decisions I have made.      Electronically signed by:    All risks, benefits and alternatives were discussed with patient.  Patient is in agreement and understands the assessment and plan.  All questions were answered.    [facility]: Phone: [phone], Fax: [phone]  [facility]: Phone: [phone],  Fax: [phone]  [facility]: Phone: [phone], Fax: [phone]  ____________________________________________    CC: No chief complaint on file.      Reviewed patients past medical history and pertinent chart review prior to patient's visit today.     HPI:  Ms. Laine Melgar is a 29 year old female who presents today as a return patient for FBSE.     Today patient reported spots of concern on her R calf - thinks it is a normal mole, but wants to double check. Father with hx of melanoma.     Has Hx of MIS.    Patient is otherwise feeling well, without additional concerns.    Labs:  N.A    Physical Exam:  Vitals: There were no vitals taken for this visit.  SKIN: Total skin excluding the undergarment areas was performed. The exam included the head/face, neck, both arms, chest, back, abdomen, both legs, digits and/or nails.    - Well healed scar at site of previous melanoma, no repigmentation or nodularity noted.    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   - right red dome shaped micropapules on the trunk  - There are waxy stuck on tan to brown  papules on the trunk.   -  LYMPH: Examination of the pre/post auricular, occipital, cervical, clavicular, axillary and inguinal lymph nodes was negative.  - No other lesions of concern on areas examined.     Medications:  No current outpatient medications on file.     No current facility-administered medications for this visit.      Past Medical/Surgical History:   Patient Active Problem List   Diagnosis     Other kyphoscoliosis and scoliosis     Dysplasia of cervix, low grade (ANABELA 1) colpo Bx     FH: melanoma - father     Hx of melanoma in situ - righ upper arm and right shoulder     Melanoma in situ of right shoulder (H)     Past Medical History:   Diagnosis Date     Dysplasia of cervix, low grade (ANABELA 1) colpo Bx 10/31/2019     HSIL on Pap smear of cervix 8/16/2019 8/13/19 HSIL & LSIL pap @ 23 yo. Plan: [place] with ECC.   9/11/19 [place] bx: ANABELA 1. Plan: Cotest in 1 yr.  9/10/20 NIL pap, no HPV ordered. Will plan cotest in 1 year  10/5/21 NIL Pap, Neg HR HPV. Plan: cotest in 1 yr.   10/11/22 NIL Pap, Neg HR HPV. Plan: cotest in 1 yr.   11/10/23 Appt     Iron deficiency anemia 6/28/2012                        Again, thank you for allowing me to participate in the care of your patient.        Sincerely,        Nan Mera PA-C    Electronically signed

## 2025-05-14 NOTE — PROGRESS NOTES
[facility] Dermatology Note  Encounter Date: May 14, 2025  Office Visit      Dermatology Problem List:  Last FBSC performed on 5/14/25    1. Hx of MIS  -  MIS - right shoulder, s/p excision 5/23/22     Family Hx: Father had melanoma.   ____________________________________________    Assessment & Plan:  # Hx of MIS, R shoulder   - ABCDEs: Counseled ABCDEs of melanoma: Asymmetry, Border (irregularity), Color (not uniform, changes in color), Diameter (greater than 6 mm which is about the size of a pencil eraser), and Evolving (any changes in preexisting moles).  - Sun protection: Counseled SPF30+ sunscreen, UPF clothing, sun avoidance, tanning bed avoidance.   - Recommended yearly dental, ophthalmology, and gynecology exams.  - Recommended skin exams for all first-degree relatives.  - Recommended follow up is 6-12 months    # Benign findings: multiple benign nevi, SKs, cherry angiomas  - edu on benign etiology  - Signs and Symptoms of non-melanoma skin cancer and ABCDEs of melanoma reviewed with patient. Patient encouraged to perform monthly self skin exams and educated on how to perform them. UV precautions reviewed with patient. Patient was asked about new or changing moles/lesions on body.   - Sunscreen: Apply 20 minutes prior to going outdoors and reapply every two hours, when wet or sweating. We recommend using an SPF 30 or higher, and to use one that is water resistant.     - RTC for changes     Procedures Performed:   None     Follow-up: 1 year(s) in-person, or earlier for new or changing lesions    Staff and scribe     Scribe Disclosure:   I, NANI BEAN, am serving as a scribe; to document services personally performed by Nan Mera PA-C -based on data collection and the provider's statements to me.     Provider Disclosure:  I agree with above History, Review of Systems, Physical exam and Plan.  I have reviewed the content of the documentation and have edited it as needed. I have  personally performed the services documented here and the documentation accurately represents those services and the decisions I have made.      Electronically signed by:    All risks, benefits and alternatives were discussed with patient.  Patient is in agreement and understands the assessment and plan.  All questions were answered.    [facility]: Phone: [phone], Fax: [phone]  [facility]: Phone: [phone],  Fax: [phone]  [facility]: Phone: [phone], Fax: [phone]  ____________________________________________    CC: No chief complaint on file.      Reviewed patients past medical history and pertinent chart review prior to patient's visit today.     HPI:  Ms. Laine Melgar is a 29 year old female who presents today as a return patient for FBSE.     Today patient reported spots of concern on her R calf - thinks it is a normal mole, but wants to double check. Father with hx of melanoma.     Has Hx of MIS.    Patient is otherwise feeling well, without additional concerns.    Labs:  N.A    Physical Exam:  Vitals: There were no vitals taken for this visit.  SKIN: Total skin excluding the undergarment areas was performed. The exam included the head/face, neck, both arms, chest, back, abdomen, both legs, digits and/or nails.    - Well healed scar at site of previous melanoma, no repigmentation or nodularity noted.    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   - right red dome shaped micropapules on the trunk  - There are waxy stuck on tan to brown papules on the trunk.   -  LYMPH: Examination of the pre/post auricular, occipital, cervical, clavicular, axillary and inguinal lymph nodes was negative.  - No other lesions of concern on areas examined.     Medications:  No current  outpatient medications on file.     No current facility-administered medications for this visit.      Past Medical/Surgical History:   Patient Active Problem List   Diagnosis    Other kyphoscoliosis and scoliosis    Dysplasia of cervix, low grade (ANABELA 1) colpo Bx    FH: melanoma - father    Hx of melanoma in situ - righ upper arm and right shoulder    Melanoma in situ of right shoulder (H)     Past Medical History:   Diagnosis Date    Dysplasia of cervix, low grade (ANABELA 1) colpo Bx 10/31/2019    HSIL on Pap smear of cervix 8/16/2019 8/13/19 HSIL & LSIL pap @ 25 yo. Plan: [place] with ECC.   9/11/19 [place] bx: ANABELA 1. Plan: Cotest in 1 yr.  9/10/20 NIL pap, no HPV ordered. Will plan cotest in 1 year  10/5/21 NIL Pap, Neg HR HPV. Plan: cotest in 1 yr.   10/11/22 NIL Pap, Neg HR HPV. Plan: cotest in 1 yr.   11/10/23 Appt    Iron deficiency anemia 6/28/2012

## 2025-05-14 NOTE — NURSING NOTE
Laine Melgar's goals for this visit include:   Chief Complaint   Patient presents with    Skin Check     FBSC. No concerns       She requests these members of her care team be copied on today's visit information:     PCP: Geno Macias    Referring Provider:  Nan Mera PA-C  [address] [place] [id number]    There were no vitals taken for this visit.    Do you need any medication refills at today's visit?     Gayatri Silva LPN on 5/14/2025 at 2:53 PM